# Patient Record
Sex: FEMALE | Race: WHITE | Employment: FULL TIME
[De-identification: names, ages, dates, MRNs, and addresses within clinical notes are randomized per-mention and may not be internally consistent; named-entity substitution may affect disease eponyms.]

---

## 2017-02-07 ENCOUNTER — TELEPHONE (OUTPATIENT)
Dept: FAMILY MEDICINE CLINIC | Facility: CLINIC | Age: 52
End: 2017-02-07

## 2017-02-08 ENCOUNTER — OFFICE VISIT (OUTPATIENT)
Dept: PRIMARY CARE CLINIC | Facility: CLINIC | Age: 52
End: 2017-02-08

## 2017-02-08 VITALS
BODY MASS INDEX: 28.22 KG/M2 | HEART RATE: 65 BPM | SYSTOLIC BLOOD PRESSURE: 142 MMHG | RESPIRATION RATE: 16 BRPM | DIASTOLIC BLOOD PRESSURE: 92 MMHG | HEIGHT: 59 IN | TEMPERATURE: 97.8 F | WEIGHT: 140 LBS | OXYGEN SATURATION: 96 %

## 2017-02-08 DIAGNOSIS — R51.9 ACUTE NONINTRACTABLE HEADACHE, UNSPECIFIED HEADACHE TYPE: Primary | ICD-10-CM

## 2017-02-08 LAB
INFLUENZA A ANTIBODY: NEGATIVE
INFLUENZA B ANTIBODY: NEGATIVE

## 2017-02-08 PROCEDURE — 87804 INFLUENZA ASSAY W/OPTIC: CPT | Performed by: NURSE PRACTITIONER

## 2017-02-08 PROCEDURE — 96372 THER/PROPH/DIAG INJ SC/IM: CPT | Performed by: NURSE PRACTITIONER

## 2017-02-08 PROCEDURE — 99213 OFFICE O/P EST LOW 20 MIN: CPT | Performed by: NURSE PRACTITIONER

## 2017-02-08 ASSESSMENT — ENCOUNTER SYMPTOMS
PHOTOPHOBIA: 1
RESPIRATORY NEGATIVE: 1

## 2017-03-10 RX ORDER — TOPIRAMATE 50 MG/1
TABLET, FILM COATED ORAL
Qty: 60 TABLET | Refills: 0 | Status: SHIPPED | OUTPATIENT
Start: 2017-03-10 | End: 2017-04-25 | Stop reason: SDUPTHER

## 2017-03-29 RX ORDER — ESCITALOPRAM OXALATE 20 MG/1
TABLET ORAL
Qty: 30 TABLET | Refills: 0 | Status: SHIPPED | OUTPATIENT
Start: 2017-03-29 | End: 2017-12-07 | Stop reason: SDUPTHER

## 2017-03-31 ENCOUNTER — OFFICE VISIT (OUTPATIENT)
Dept: PRIMARY CARE CLINIC | Age: 52
End: 2017-03-31
Payer: COMMERCIAL

## 2017-03-31 VITALS
BODY MASS INDEX: 28.22 KG/M2 | TEMPERATURE: 98.4 F | SYSTOLIC BLOOD PRESSURE: 127 MMHG | HEIGHT: 59 IN | HEART RATE: 64 BPM | DIASTOLIC BLOOD PRESSURE: 85 MMHG | OXYGEN SATURATION: 95 % | WEIGHT: 140 LBS | RESPIRATION RATE: 16 BRPM

## 2017-03-31 DIAGNOSIS — G43.009 MIGRAINE WITHOUT AURA AND WITHOUT STATUS MIGRAINOSUS, NOT INTRACTABLE: Primary | ICD-10-CM

## 2017-03-31 PROCEDURE — 99213 OFFICE O/P EST LOW 20 MIN: CPT | Performed by: NURSE PRACTITIONER

## 2017-03-31 PROCEDURE — 96372 THER/PROPH/DIAG INJ SC/IM: CPT | Performed by: NURSE PRACTITIONER

## 2017-03-31 RX ORDER — KETOROLAC TROMETHAMINE 30 MG/ML
60 INJECTION, SOLUTION INTRAMUSCULAR; INTRAVENOUS ONCE
Status: COMPLETED | OUTPATIENT
Start: 2017-03-31 | End: 2017-03-31

## 2017-03-31 RX ORDER — ONDANSETRON 4 MG/1
4 TABLET, ORALLY DISINTEGRATING ORAL EVERY 8 HOURS PRN
Qty: 12 TABLET | Refills: 0 | Status: SHIPPED | OUTPATIENT
Start: 2017-03-31 | End: 2017-04-04

## 2017-03-31 RX ADMIN — KETOROLAC TROMETHAMINE 60 MG: 30 INJECTION, SOLUTION INTRAMUSCULAR; INTRAVENOUS at 18:06

## 2017-03-31 ASSESSMENT — ENCOUNTER SYMPTOMS
EYE PAIN: 0
NAUSEA: 1
SINUS PRESSURE: 0
SWOLLEN GLANDS: 0
COUGH: 0
EYE WATERING: 0
RHINORRHEA: 0
VOMITING: 0
BLURRED VISION: 1
FACIAL SWEATING: 0
SCALP TENDERNESS: 0
VISUAL CHANGE: 0
ABDOMINAL PAIN: 0
SORE THROAT: 0
BACK PAIN: 0
PHOTOPHOBIA: 1
EYE REDNESS: 0

## 2017-04-07 LAB
CHOLESTEROL/HDL RATIO: 4
CHOLESTEROL: 233 MG/DL
GLUCOSE BLD-MCNC: 99 MG/DL (ref 70–99)
HDLC SERPL-MCNC: 58 MG/DL
LDL CHOLESTEROL: 148 MG/DL (ref 0–130)
PATIENT FASTING?: YES
TRIGL SERPL-MCNC: 133 MG/DL
VLDLC SERPL CALC-MCNC: 27 MG/DL (ref 1–30)

## 2017-04-17 RX ORDER — TOPIRAMATE 50 MG/1
TABLET, FILM COATED ORAL
Qty: 60 TABLET | Refills: 0 | Status: SHIPPED | OUTPATIENT
Start: 2017-04-17 | End: 2017-04-25 | Stop reason: SDUPTHER

## 2017-04-21 ENCOUNTER — APPOINTMENT (OUTPATIENT)
Dept: CT IMAGING | Age: 52
End: 2017-04-21
Payer: COMMERCIAL

## 2017-04-21 ENCOUNTER — HOSPITAL ENCOUNTER (EMERGENCY)
Age: 52
Discharge: HOME | End: 2017-04-21
Attending: EMERGENCY MEDICINE
Payer: COMMERCIAL

## 2017-04-21 VITALS
HEART RATE: 61 BPM | DIASTOLIC BLOOD PRESSURE: 95 MMHG | RESPIRATION RATE: 14 BRPM | OXYGEN SATURATION: 96 % | TEMPERATURE: 98 F | SYSTOLIC BLOOD PRESSURE: 157 MMHG

## 2017-04-21 DIAGNOSIS — G44.009 CLUSTER HEADACHE, NOT INTRACTABLE, UNSPECIFIED CHRONICITY PATTERN: Primary | ICD-10-CM

## 2017-04-21 PROCEDURE — 99284 EMERGENCY DEPT VISIT MOD MDM: CPT

## 2017-04-21 PROCEDURE — 96375 TX/PRO/DX INJ NEW DRUG ADDON: CPT

## 2017-04-21 PROCEDURE — 2580000003 HC RX 258: Performed by: EMERGENCY MEDICINE

## 2017-04-21 PROCEDURE — 70450 CT HEAD/BRAIN W/O DYE: CPT

## 2017-04-21 PROCEDURE — 96374 THER/PROPH/DIAG INJ IV PUSH: CPT

## 2017-04-21 PROCEDURE — 6360000002 HC RX W HCPCS: Performed by: EMERGENCY MEDICINE

## 2017-04-21 RX ORDER — ONDANSETRON 2 MG/ML
4 INJECTION INTRAMUSCULAR; INTRAVENOUS ONCE
Status: COMPLETED | OUTPATIENT
Start: 2017-04-21 | End: 2017-04-21

## 2017-04-21 RX ORDER — NALBUPHINE HCL 10 MG/ML
10 AMPUL (ML) INJECTION ONCE
Status: COMPLETED | OUTPATIENT
Start: 2017-04-21 | End: 2017-04-21

## 2017-04-21 RX ORDER — PROMETHAZINE HYDROCHLORIDE 25 MG/ML
12.5 INJECTION, SOLUTION INTRAMUSCULAR; INTRAVENOUS ONCE
Status: COMPLETED | OUTPATIENT
Start: 2017-04-21 | End: 2017-04-21

## 2017-04-21 RX ORDER — ONDANSETRON 4 MG/1
4 TABLET, ORALLY DISINTEGRATING ORAL EVERY 8 HOURS PRN
Qty: 6 TABLET | Refills: 0 | Status: SHIPPED | OUTPATIENT
Start: 2017-04-21 | End: 2017-04-23

## 2017-04-21 RX ORDER — HYDROCODONE BITARTRATE AND ACETAMINOPHEN 5; 325 MG/1; MG/1
1 TABLET ORAL EVERY 6 HOURS PRN
Qty: 4 TABLET | Refills: 0 | Status: SHIPPED | OUTPATIENT
Start: 2017-04-21 | End: 2017-04-22

## 2017-04-21 RX ADMIN — HYDROMORPHONE HYDROCHLORIDE 0.5 MG: 1 INJECTION, SOLUTION INTRAMUSCULAR; INTRAVENOUS; SUBCUTANEOUS at 12:54

## 2017-04-21 RX ADMIN — SODIUM CHLORIDE 500 ML: 9 INJECTION, SOLUTION INTRAVENOUS at 12:01

## 2017-04-21 RX ADMIN — PROMETHAZINE HYDROCHLORIDE 12.5 MG: 25 INJECTION INTRAMUSCULAR; INTRAVENOUS at 12:54

## 2017-04-21 RX ADMIN — ONDANSETRON 4 MG: 2 INJECTION INTRAMUSCULAR; INTRAVENOUS at 12:01

## 2017-04-21 RX ADMIN — NALBUPHINE HYDROCHLORIDE 10 MG: 10 INJECTION, SOLUTION INTRAMUSCULAR; INTRAVENOUS; SUBCUTANEOUS at 12:01

## 2017-04-21 ASSESSMENT — ENCOUNTER SYMPTOMS
TINGLING: 0
GASTROINTESTINAL NEGATIVE: 1
VISUAL CHANGE: 0
VOMITING: 0
BACK PAIN: 0
DIARRHEA: 0
COUGH: 0
PHOTOPHOBIA: 0
SORE THROAT: 0
RESPIRATORY NEGATIVE: 1
ABDOMINAL PAIN: 0
SWOLLEN GLANDS: 0
FACIAL SWELLING: 0
TROUBLE SWALLOWING: 0
EYES NEGATIVE: 1
BLURRED VISION: 0
ALLERGIC/IMMUNOLOGIC NEGATIVE: 1
VOICE CHANGE: 0
EYE PAIN: 0
SINUS PRESSURE: 0
RHINORRHEA: 0

## 2017-04-21 ASSESSMENT — PAIN SCALES - GENERAL
PAINLEVEL_OUTOF10: 2
PAINLEVEL_OUTOF10: 10
PAINLEVEL_OUTOF10: 10

## 2017-04-24 ENCOUNTER — TELEPHONE (OUTPATIENT)
Dept: FAMILY MEDICINE CLINIC | Age: 52
End: 2017-04-24

## 2017-04-25 ENCOUNTER — OFFICE VISIT (OUTPATIENT)
Dept: FAMILY MEDICINE CLINIC | Age: 52
End: 2017-04-25
Payer: COMMERCIAL

## 2017-04-25 VITALS — BODY MASS INDEX: 28.88 KG/M2 | WEIGHT: 143 LBS | SYSTOLIC BLOOD PRESSURE: 130 MMHG | DIASTOLIC BLOOD PRESSURE: 80 MMHG

## 2017-04-25 DIAGNOSIS — F41.9 ANXIETY: ICD-10-CM

## 2017-04-25 DIAGNOSIS — Z12.11 SCREENING FOR COLON CANCER: ICD-10-CM

## 2017-04-25 DIAGNOSIS — K58.2 IRRITABLE BOWEL SYNDROME WITH BOTH CONSTIPATION AND DIARRHEA: ICD-10-CM

## 2017-04-25 DIAGNOSIS — G43.109 MIGRAINE WITH AURA AND WITHOUT STATUS MIGRAINOSUS, NOT INTRACTABLE: Primary | ICD-10-CM

## 2017-04-25 PROCEDURE — 99213 OFFICE O/P EST LOW 20 MIN: CPT | Performed by: FAMILY MEDICINE

## 2017-04-25 RX ORDER — ELETRIPTAN HYDROBROMIDE 40 MG/1
40 TABLET, FILM COATED ORAL
Qty: 12 TABLET | Refills: 3 | Status: SHIPPED | OUTPATIENT
Start: 2017-04-25 | End: 2018-03-26 | Stop reason: SDUPTHER

## 2017-04-25 RX ORDER — TOPIRAMATE 50 MG/1
TABLET, FILM COATED ORAL
Qty: 180 TABLET | Refills: 1 | Status: SHIPPED | OUTPATIENT
Start: 2017-04-25 | End: 2018-03-06 | Stop reason: SDUPTHER

## 2017-04-25 RX ORDER — PANTOPRAZOLE SODIUM 40 MG/1
40 TABLET, DELAYED RELEASE ORAL
Qty: 90 TABLET | Refills: 1 | Status: SHIPPED | OUTPATIENT
Start: 2017-04-25 | End: 2018-03-26 | Stop reason: SDUPTHER

## 2017-04-25 RX ORDER — TIZANIDINE 4 MG/1
8 TABLET ORAL NIGHTLY
Qty: 180 TABLET | Refills: 1 | Status: SHIPPED | OUTPATIENT
Start: 2017-04-25 | End: 2018-03-26 | Stop reason: SDUPTHER

## 2017-04-25 ASSESSMENT — ENCOUNTER SYMPTOMS
EYE DISCHARGE: 0
EYE WATERING: 0
SCALP TENDERNESS: 0
NAUSEA: 1
EYE REDNESS: 0
EYE PAIN: 0
SORE THROAT: 0
COUGH: 0
ABDOMINAL PAIN: 0
DIARRHEA: 0
PHOTOPHOBIA: 1
SINUS PRESSURE: 0
SHORTNESS OF BREATH: 0
BLOOD IN STOOL: 0
BLURRED VISION: 1
SWOLLEN GLANDS: 0
VOMITING: 0
CONSTIPATION: 0

## 2017-04-25 ASSESSMENT — PATIENT HEALTH QUESTIONNAIRE - PHQ9
1. LITTLE INTEREST OR PLEASURE IN DOING THINGS: 0
SUM OF ALL RESPONSES TO PHQ QUESTIONS 1-9: 0
SUM OF ALL RESPONSES TO PHQ9 QUESTIONS 1 & 2: 0
2. FEELING DOWN, DEPRESSED OR HOPELESS: 0

## 2017-05-11 ENCOUNTER — HOSPITAL ENCOUNTER (EMERGENCY)
Age: 52
Discharge: HOME | End: 2017-05-11
Attending: EMERGENCY MEDICINE
Payer: COMMERCIAL

## 2017-05-11 VITALS
RESPIRATION RATE: 18 BRPM | HEART RATE: 73 BPM | SYSTOLIC BLOOD PRESSURE: 122 MMHG | DIASTOLIC BLOOD PRESSURE: 90 MMHG | OXYGEN SATURATION: 99 % | TEMPERATURE: 97.4 F | BODY MASS INDEX: 28.68 KG/M2 | WEIGHT: 142 LBS

## 2017-05-11 DIAGNOSIS — M54.50 LOW BACK PAIN POTENTIALLY ASSOCIATED WITH RADICULOPATHY: Primary | ICD-10-CM

## 2017-05-11 PROCEDURE — 6360000002 HC RX W HCPCS: Performed by: EMERGENCY MEDICINE

## 2017-05-11 PROCEDURE — 99283 EMERGENCY DEPT VISIT LOW MDM: CPT

## 2017-05-11 PROCEDURE — 20553 NJX 1/MLT TRIGGER POINTS 3/>: CPT

## 2017-05-11 PROCEDURE — 6370000000 HC RX 637 (ALT 250 FOR IP): Performed by: EMERGENCY MEDICINE

## 2017-05-11 RX ORDER — PREDNISONE 10 MG/1
TABLET ORAL
Qty: 20 TABLET | Refills: 0 | Status: SHIPPED | OUTPATIENT
Start: 2017-05-11 | End: 2017-05-21

## 2017-05-11 RX ORDER — LIDOCAINE HYDROCHLORIDE 10 MG/ML
20 INJECTION, SOLUTION INFILTRATION; PERINEURAL ONCE
Status: DISCONTINUED | OUTPATIENT
Start: 2017-05-11 | End: 2017-05-11 | Stop reason: HOSPADM

## 2017-05-11 RX ORDER — KETOROLAC TROMETHAMINE 30 MG/ML
60 INJECTION, SOLUTION INTRAMUSCULAR; INTRAVENOUS ONCE
Status: COMPLETED | OUTPATIENT
Start: 2017-05-11 | End: 2017-05-11

## 2017-05-11 RX ORDER — DIAZEPAM 5 MG/ML
2.5 INJECTION, SOLUTION INTRAMUSCULAR; INTRAVENOUS ONCE
Status: DISCONTINUED | OUTPATIENT
Start: 2017-05-11 | End: 2017-05-11

## 2017-05-11 RX ORDER — DIAZEPAM 5 MG/ML
2.5 INJECTION, SOLUTION INTRAMUSCULAR; INTRAVENOUS ONCE
Status: COMPLETED | OUTPATIENT
Start: 2017-05-11 | End: 2017-05-11

## 2017-05-11 RX ORDER — HYDROCODONE BITARTRATE AND ACETAMINOPHEN 5; 325 MG/1; MG/1
1 TABLET ORAL EVERY 6 HOURS PRN
Qty: 10 TABLET | Refills: 0 | Status: SHIPPED | OUTPATIENT
Start: 2017-05-11 | End: 2017-05-18

## 2017-05-11 RX ORDER — PREDNISONE 20 MG/1
60 TABLET ORAL ONCE
Status: COMPLETED | OUTPATIENT
Start: 2017-05-11 | End: 2017-05-11

## 2017-05-11 RX ORDER — IBUPROFEN 800 MG/1
800 TABLET ORAL EVERY 8 HOURS PRN
Qty: 30 TABLET | Refills: 0 | Status: SHIPPED | OUTPATIENT
Start: 2017-05-11

## 2017-05-11 RX ORDER — FENTANYL CITRATE 50 UG/ML
50 INJECTION, SOLUTION INTRAMUSCULAR; INTRAVENOUS ONCE
Status: COMPLETED | OUTPATIENT
Start: 2017-05-11 | End: 2017-05-11

## 2017-05-11 RX ADMIN — DIAZEPAM 2.5 MG: 5 INJECTION, SOLUTION INTRAMUSCULAR; INTRAVENOUS at 14:37

## 2017-05-11 RX ADMIN — FENTANYL CITRATE 50 MCG: 50 INJECTION INTRAMUSCULAR; INTRAVENOUS at 14:39

## 2017-05-11 RX ADMIN — PREDNISONE 60 MG: 20 TABLET ORAL at 13:26

## 2017-05-11 RX ADMIN — DIAZEPAM 2.5 MG: 5 INJECTION, SOLUTION INTRAMUSCULAR; INTRAVENOUS at 13:26

## 2017-05-11 RX ADMIN — KETOROLAC TROMETHAMINE 60 MG: 30 INJECTION, SOLUTION INTRAMUSCULAR at 13:25

## 2017-05-11 ASSESSMENT — PAIN SCALES - GENERAL
PAINLEVEL_OUTOF10: 6
PAINLEVEL_OUTOF10: 10
PAINLEVEL_OUTOF10: 8
PAINLEVEL_OUTOF10: 10
PAINLEVEL_OUTOF10: 10

## 2017-05-11 ASSESSMENT — PAIN DESCRIPTION - ORIENTATION: ORIENTATION: LOWER;RIGHT

## 2017-05-11 ASSESSMENT — PAIN DESCRIPTION - PAIN TYPE
TYPE: ACUTE PAIN
TYPE: ACUTE PAIN

## 2017-05-11 ASSESSMENT — PAIN DESCRIPTION - DESCRIPTORS: DESCRIPTORS: SHOOTING

## 2017-05-11 ASSESSMENT — PAIN DESCRIPTION - LOCATION
LOCATION: BACK
LOCATION: BACK

## 2017-05-11 ASSESSMENT — ENCOUNTER SYMPTOMS: BACK PAIN: 1

## 2017-05-12 ENCOUNTER — TELEPHONE (OUTPATIENT)
Dept: FAMILY MEDICINE CLINIC | Facility: CLINIC | Age: 52
End: 2017-05-12

## 2017-06-08 ENCOUNTER — OFFICE VISIT (OUTPATIENT)
Dept: FAMILY MEDICINE CLINIC | Age: 52
End: 2017-06-08
Payer: COMMERCIAL

## 2017-06-08 VITALS
OXYGEN SATURATION: 98 % | BODY MASS INDEX: 28.68 KG/M2 | SYSTOLIC BLOOD PRESSURE: 118 MMHG | DIASTOLIC BLOOD PRESSURE: 72 MMHG | HEART RATE: 74 BPM | WEIGHT: 142 LBS

## 2017-06-08 DIAGNOSIS — M54.16 LUMBAR BACK PAIN WITH RADICULOPATHY AFFECTING RIGHT LOWER EXTREMITY: Primary | ICD-10-CM

## 2017-06-08 PROCEDURE — 99213 OFFICE O/P EST LOW 20 MIN: CPT | Performed by: FAMILY MEDICINE

## 2017-06-08 RX ORDER — PREDNISONE 1 MG/1
TABLET ORAL
Qty: 21 TABLET | Refills: 0 | Status: SHIPPED | OUTPATIENT
Start: 2017-06-08 | End: 2017-07-17 | Stop reason: ALTCHOICE

## 2017-06-08 ASSESSMENT — ENCOUNTER SYMPTOMS: BACK PAIN: 1

## 2017-06-14 ENCOUNTER — TELEPHONE (OUTPATIENT)
Dept: FAMILY MEDICINE CLINIC | Age: 52
End: 2017-06-14

## 2017-06-14 DIAGNOSIS — M54.16 LUMBAR BACK PAIN WITH RADICULOPATHY AFFECTING RIGHT LOWER EXTREMITY: Primary | ICD-10-CM

## 2017-06-15 ASSESSMENT — ENCOUNTER SYMPTOMS
NAUSEA: 0
WHEEZING: 0
DIARRHEA: 0
COUGH: 0
COLOR CHANGE: 0
PHOTOPHOBIA: 0
CONSTIPATION: 0
ABDOMINAL DISTENTION: 0
ABDOMINAL PAIN: 0
SHORTNESS OF BREATH: 0
TROUBLE SWALLOWING: 0
VOMITING: 0
BOWEL INCONTINENCE: 0

## 2017-06-28 ENCOUNTER — HOSPITAL ENCOUNTER (OUTPATIENT)
Dept: MRI IMAGING | Age: 52
Discharge: HOME | End: 2017-06-28
Payer: COMMERCIAL

## 2017-06-28 DIAGNOSIS — M54.16 LUMBAR BACK PAIN WITH RADICULOPATHY AFFECTING RIGHT LOWER EXTREMITY: ICD-10-CM

## 2017-06-28 PROCEDURE — 72148 MRI LUMBAR SPINE W/O DYE: CPT

## 2017-07-17 ENCOUNTER — HOSPITAL ENCOUNTER (EMERGENCY)
Age: 52
Discharge: HOME | End: 2017-07-17
Attending: EMERGENCY MEDICINE
Payer: COMMERCIAL

## 2017-07-17 VITALS
OXYGEN SATURATION: 98 % | RESPIRATION RATE: 14 BRPM | TEMPERATURE: 98.4 F | HEART RATE: 56 BPM | SYSTOLIC BLOOD PRESSURE: 172 MMHG | DIASTOLIC BLOOD PRESSURE: 75 MMHG

## 2017-07-17 DIAGNOSIS — G43.811 OTHER MIGRAINE WITH STATUS MIGRAINOSUS, INTRACTABLE: Primary | ICD-10-CM

## 2017-07-17 PROCEDURE — 99283 EMERGENCY DEPT VISIT LOW MDM: CPT

## 2017-07-17 PROCEDURE — 2580000003 HC RX 258: Performed by: EMERGENCY MEDICINE

## 2017-07-17 PROCEDURE — 96372 THER/PROPH/DIAG INJ SC/IM: CPT

## 2017-07-17 PROCEDURE — 6360000002 HC RX W HCPCS: Performed by: EMERGENCY MEDICINE

## 2017-07-17 PROCEDURE — 96374 THER/PROPH/DIAG INJ IV PUSH: CPT

## 2017-07-17 RX ORDER — KETOROLAC TROMETHAMINE 30 MG/ML
30 INJECTION, SOLUTION INTRAMUSCULAR; INTRAVENOUS ONCE
Status: COMPLETED | OUTPATIENT
Start: 2017-07-17 | End: 2017-07-17

## 2017-07-17 RX ORDER — DIPHENHYDRAMINE HYDROCHLORIDE 50 MG/ML
25 INJECTION INTRAMUSCULAR; INTRAVENOUS ONCE
Status: COMPLETED | OUTPATIENT
Start: 2017-07-17 | End: 2017-07-17

## 2017-07-17 RX ORDER — ONDANSETRON 4 MG/1
4 TABLET, ORALLY DISINTEGRATING ORAL ONCE
Status: COMPLETED | OUTPATIENT
Start: 2017-07-17 | End: 2017-07-17

## 2017-07-17 RX ORDER — 0.9 % SODIUM CHLORIDE 0.9 %
1000 INTRAVENOUS SOLUTION INTRAVENOUS ONCE
Status: COMPLETED | OUTPATIENT
Start: 2017-07-17 | End: 2017-07-17

## 2017-07-17 RX ORDER — ELETRIPTAN HYDROBROMIDE 40 MG/1
40 TABLET, FILM COATED ORAL
COMMUNITY
End: 2021-12-09 | Stop reason: ALTCHOICE

## 2017-07-17 RX ADMIN — KETOROLAC TROMETHAMINE 30 MG: 30 INJECTION, SOLUTION INTRAMUSCULAR at 16:50

## 2017-07-17 RX ADMIN — HYDROMORPHONE HYDROCHLORIDE 1 MG: 1 INJECTION, SOLUTION INTRAMUSCULAR; INTRAVENOUS; SUBCUTANEOUS at 16:07

## 2017-07-17 RX ADMIN — ONDANSETRON 4 MG: 4 TABLET, ORALLY DISINTEGRATING ORAL at 16:30

## 2017-07-17 RX ADMIN — DIPHENHYDRAMINE HYDROCHLORIDE 25 MG: 50 INJECTION, SOLUTION INTRAMUSCULAR; INTRAVENOUS at 16:07

## 2017-07-17 RX ADMIN — SODIUM CHLORIDE 1000 ML: 9 INJECTION, SOLUTION INTRAVENOUS at 16:51

## 2017-07-17 ASSESSMENT — PAIN DESCRIPTION - LOCATION: LOCATION: HEAD

## 2017-07-17 ASSESSMENT — PAIN SCALES - GENERAL
PAINLEVEL_OUTOF10: 10
PAINLEVEL_OUTOF10: 0
PAINLEVEL_OUTOF10: 8

## 2017-07-17 ASSESSMENT — PAIN DESCRIPTION - PROGRESSION: CLINICAL_PROGRESSION: GRADUALLY WORSENING

## 2017-07-17 ASSESSMENT — PAIN DESCRIPTION - PAIN TYPE: TYPE: ACUTE PAIN

## 2017-07-17 ASSESSMENT — PAIN DESCRIPTION - FREQUENCY: FREQUENCY: CONTINUOUS

## 2017-07-17 ASSESSMENT — PAIN DESCRIPTION - ORIENTATION: ORIENTATION: LEFT;RIGHT

## 2017-07-17 ASSESSMENT — PAIN DESCRIPTION - DESCRIPTORS: DESCRIPTORS: CONSTANT

## 2017-07-19 ENCOUNTER — CARE COORDINATION (OUTPATIENT)
Dept: FAMILY MEDICINE CLINIC | Age: 52
End: 2017-07-19

## 2017-08-01 ENCOUNTER — INITIAL CONSULT (OUTPATIENT)
Dept: NEUROSURGERY | Age: 52
End: 2017-08-01
Payer: COMMERCIAL

## 2017-08-01 VITALS
HEART RATE: 65 BPM | WEIGHT: 140 LBS | BODY MASS INDEX: 28.28 KG/M2 | DIASTOLIC BLOOD PRESSURE: 88 MMHG | SYSTOLIC BLOOD PRESSURE: 135 MMHG

## 2017-08-01 DIAGNOSIS — M54.50 CHRONIC MIDLINE LOW BACK PAIN WITHOUT SCIATICA: Primary | ICD-10-CM

## 2017-08-01 DIAGNOSIS — G89.29 CHRONIC MIDLINE LOW BACK PAIN WITHOUT SCIATICA: Primary | ICD-10-CM

## 2017-08-01 PROCEDURE — 99243 OFF/OP CNSLTJ NEW/EST LOW 30: CPT | Performed by: NEUROLOGICAL SURGERY

## 2017-08-01 ASSESSMENT — VISUAL ACUITY: VA_NORMAL: 1

## 2017-12-05 ENCOUNTER — OFFICE VISIT (OUTPATIENT)
Dept: PRIMARY CARE CLINIC | Age: 52
End: 2017-12-05
Payer: COMMERCIAL

## 2017-12-05 VITALS
TEMPERATURE: 98.1 F | HEART RATE: 59 BPM | BODY MASS INDEX: 28.22 KG/M2 | OXYGEN SATURATION: 97 % | WEIGHT: 140 LBS | RESPIRATION RATE: 22 BRPM | DIASTOLIC BLOOD PRESSURE: 80 MMHG | SYSTOLIC BLOOD PRESSURE: 125 MMHG | HEIGHT: 59 IN

## 2017-12-05 DIAGNOSIS — G43.909 MIGRAINE WITHOUT STATUS MIGRAINOSUS, NOT INTRACTABLE, UNSPECIFIED MIGRAINE TYPE: Primary | ICD-10-CM

## 2017-12-05 PROCEDURE — 99213 OFFICE O/P EST LOW 20 MIN: CPT | Performed by: NURSE PRACTITIONER

## 2017-12-05 PROCEDURE — 96372 THER/PROPH/DIAG INJ SC/IM: CPT | Performed by: NURSE PRACTITIONER

## 2017-12-05 ASSESSMENT — ENCOUNTER SYMPTOMS
PHOTOPHOBIA: 1
RESPIRATORY NEGATIVE: 1

## 2017-12-05 NOTE — PROGRESS NOTES
Nerves:   CN II: Visual fields intact to confrontation. CN III, IV, VI: External ocular movements full, no afferent defect, no LIDA, no ptosis. CN V: Normal facial sensation bilaterally. CN VII: Normal facial symmetry. CN VIII: Intact hearing. CN IX, X: Symmetrical palate. CN XI: Symmetrical shoulder shrug. CN XII: Midline tongue, no atrophy. Speaks clear without slur. Motor: Normal bulk, normal tone, normal power, no involuntary movements, no tremor. Sensory: Normal touch, normal pen, normal vibration. Cerebellar: Intact fine motor control and upper extremities. Station and Gait: Normal Station, normal gait        Skin: No rash noted. Nursing note and vitals reviewed. /80   Pulse 59   Temp 98.1 °F (36.7 °C) (Oral)   Resp 22   Ht 4' 11\" (1.499 m)   Wt 140 lb (63.5 kg)   SpO2 97%   BMI 28.28 kg/m²     Assessment:     1. Migraine without status migrainosus, not intractable, unspecified migraine type  ketorolac (TORADOL) injection 30 mg       Plan:   Elina Glynn was seen today for migraine. Diagnoses and all orders for this visit:    Migraine without status migrainosus, not intractable, unspecified migraine type  -     ketorolac (TORADOL) injection 30 mg; Inject 1 mL into the muscle once    Discussed exam, POCT findings, plan of care (including prescriptive and supportive as listed below) and follow-up at length with patient. Recommend  IM Toradol today; educated patient on all medications including administration, expected results and side effects. Understanding voiced. To ER or call 911 if any difficulty breathing, shortness of breath, inability to swallow, hives or temp greater than 103 degrees. Return if symptoms worsen or fail to improve, for ED for worsening symptoms. Orders Placed This Encounter   Medications    ketorolac (TORADOL) injection 30 mg          All patient questions answered. Pt voiced understanding.       Electronically signed by Patrick North

## 2017-12-05 NOTE — PATIENT INSTRUCTIONS
about ketorolac? You should not use ketorolac if you have any active or recent bleeding (including bleeding inside your body), a head injury, a stomach ulcer, severe kidney disease, a bleeding or blood-clotting disorder, a history of severe allergic reaction to aspirin or an NSAID, if you are scheduled to have surgery, if you are in late pregnancy, or if you are breast-feeding a baby. You should not use ketorolac if you also take pentoxifylline, probenecid, aspirin, or other NSAIDs. Ketorolac can increase your risk of fatal heart attack or stroke, especially if you use it long term or take high doses, or if you have heart disease. Do not use this medicine just before or after heart bypass surgery (coronary artery bypass graft, or CABG). Ketorolac may also cause stomach or intestinal bleeding, which can be fatal. These conditions can occur without warning while you are using ketorolac, especially in older adults. You should not take this medicine if you already have bleeding in your stomach or intestines. What is ketorolac? Ketorolac is a nonsteroidal anti-inflammatory drug (NSAID). Ketorolac works by reducing hormones that cause inflammation and pain in the body. Ketorolac is used short-term (5 days or less) to treat moderate to severe pain. Ketorolac may also be used for purposes not listed in this medication guide. What should I discuss with my healthcare provider before taking ketorolac? Ketorolac can increase your risk of fatal heart attack or stroke, especially if you use it long term or take high doses, or if you have heart disease. Even people without heart disease or risk factors could have a stroke or heart attack while taking this medicine. Do not use this medicine just before or after heart bypass surgery (coronary artery bypass graft, or CABG).   Ketorolac may also cause stomach or intestinal bleeding, which can be fatal. These conditions can occur without warning while you are using ketorolac, especially in older adults. You should not use ketorolac if you are allergic to it, or if you have:  · active or recent stomach ulcer, stomach bleeding, or intestinal bleeding;  · a bleeding or blood-clotting disorder;  · a closed head injury or bleeding in your brain;  · bleeding from a recent surgery;  · severe kidney disease or dehydration;  · a history of asthma or severe allergic reaction after taking aspirin or an NSAID;  · if you are scheduled to have surgery (especially bypass surgery); or  · if you are in late pregnancy or you are breast-feeding a baby. Some medicines can cause unwanted or dangerous effects when used with ketorolac. Your doctor may need to change your treatment plan if you use any of the following drugs:  · pentoxifylline;  · probenecid; or  · aspirin or other NSAIDs --ibuprofen (Advil, Motrin), naproxen (Aleve), celecoxib, diclofenac, indomethacin, meloxicam, and others. To make sure ketorolac is safe for you, tell your doctor if you have:  · heart disease, high blood pressure, high cholesterol, diabetes, or if you smoke;  · a history of heart attack, stroke, or blood clot;  · a history of stomach ulcers or bleeding;  · inflammatory bowel disease, ulcerative colitis, or Crohn's disease;  · liver disease;  · kidney disease (or if you are on dialysis);  · asthma; or  · fluid retention. Using ketorolac during the last 3 months of pregnancy may harm the unborn baby. Ketorolac may also increase the risk of uterine bleeding and is not for use during labor and delivery. Tell your doctor if you are pregnant. Ketorolac can pass into breast milk and may harm a nursing baby. Do not breast-feed while using this medicine. Ketorolac is not approved for use by anyone younger than 3years old. How should I take ketorolac? Ketorolac is usually given first as an injection, and then as an oral (by mouth) medicine. The injection is given into a muscle, or into a vein through an IV.  A [facility] provider will give you the injection. Follow all directions on your prescription label. Do not take this medicine in larger amounts or for longer than recommended. Use the lowest dose that is effective in treating your condition. Ketorolac should not be used for longer than 5 days, including both injection plus tablets. Long-term use of this medicine can damage your kidneys or cause bleeding. Store at room temperature away from moisture, heat, and light. Keep the bottle tightly closed when not in use. Read all patient information, medication guides, and instruction sheets provided to you. Ask your doctor or pharmacist if you have any questions. What happens if I miss a dose? Since ketorolac is used for pain, you are not likely to miss a dose. Skip any missed dose if it is almost time for your next scheduled dose. Do not use extra medicine to make up the missed dose. What happens if I overdose? Seek emergency medical attention or call the Poison Help line at 1-596.930.8271. What should I avoid while taking ketorolac? Avoid drinking alcohol. It may increase your risk of stomach bleeding. Ask a doctor or pharmacist before using any cold, allergy, or pain medication. Many medicines available over the counter contain aspirin or other medicines similar to ketorolac. Taking certain products together can cause you to get too much of this type of medication. Check the label to see if a medicine contains aspirin, ibuprofen, ketoprofen, or naproxen. What are the possible side effects of ketorolac? Get emergency medical help if you have signs of an allergic reaction: sneezing, runny or stuffy nose; wheezing or trouble breathing; hives; swelling of your face, lips, tongue, or throat. Get emergency medical help if you have signs of a heart attack or stroke: chest pain spreading to your jaw or shoulder, sudden numbness or weakness on one side of the body, slurred speech, feeling short of breath.   Stop using ketorolac and call your doctor at once if you have:  · shortness of breath (even with mild exertion); · swelling or rapid weight gain;  · the first sign of any skin rash, no matter how mild;  · signs of stomach bleeding --bloody or tarry stools, coughing up blood or vomit that looks like coffee grounds;  · liver problems --nausea, upper stomach pain, itching, tired feeling, flu-like symptoms, loss of appetite, dark urine, ernst-colored stools, jaundice (yellowing of the skin or eyes);  · kidney problems --little or no urinating, painful or difficult urination, swelling in your feet or ankles, feeling tired or short of breath;  · low red blood cells (anemia) --pale skin, feeling light-headed or short of breath, rapid heart rate, trouble concentrating; or  · severe skin reaction --fever, sore throat, swelling in your face or tongue, burning in your eyes, skin pain followed by a red or purple skin rash that spreads (especially in the face or upper body) and causes blistering and peeling. Common side effects may include:  · nausea, stomach pain, indigestion, diarrhea;  · dizziness, drowsiness;  · headache; or  · swelling. This is not a complete list of side effects and others may occur. Call your doctor for medical advice about side effects. You may report side effects to FDA at 6-357-FDA-3383. What other drugs will affect ketorolac? Ask your doctor before using ketorolac if you take an antidepressant such as citalopram, escitalopram, fluoxetine (Prozac), fluvoxamine, paroxetine, sertraline (Zoloft), trazodone, or vilazodone. Taking any of these medicines with an NSAID may cause you to bruise or bleed easily.   Tell your doctor about all your current medicines and any you start or stop using, especially:  · lithium;  · methotrexate;  · heparin or warfarin (Coumadin, Liyah Mendes);  · antipsychotic medicine;  · heart or blood pressure medication, including a diuretic or \"water pill\";  · seizure medicine (carbamazepine, phenytoin);

## 2017-12-07 RX ORDER — ESCITALOPRAM OXALATE 20 MG/1
TABLET ORAL
Qty: 90 TABLET | Refills: 1 | Status: SHIPPED | OUTPATIENT
Start: 2017-12-07 | End: 2018-08-17 | Stop reason: SDUPTHER

## 2017-12-11 ENCOUNTER — HOSPITAL ENCOUNTER (EMERGENCY)
Age: 52
Discharge: HOME | End: 2017-12-11
Attending: EMERGENCY MEDICINE
Payer: COMMERCIAL

## 2017-12-11 VITALS
DIASTOLIC BLOOD PRESSURE: 69 MMHG | WEIGHT: 145 LBS | HEIGHT: 59 IN | HEART RATE: 62 BPM | TEMPERATURE: 98 F | SYSTOLIC BLOOD PRESSURE: 125 MMHG | RESPIRATION RATE: 16 BRPM | BODY MASS INDEX: 29.23 KG/M2 | OXYGEN SATURATION: 97 %

## 2017-12-11 DIAGNOSIS — G43.911 INTRACTABLE MIGRAINE WITH STATUS MIGRAINOSUS, UNSPECIFIED MIGRAINE TYPE: Primary | ICD-10-CM

## 2017-12-11 PROCEDURE — 99283 EMERGENCY DEPT VISIT LOW MDM: CPT

## 2017-12-11 PROCEDURE — 96361 HYDRATE IV INFUSION ADD-ON: CPT

## 2017-12-11 PROCEDURE — 6360000002 HC RX W HCPCS: Performed by: EMERGENCY MEDICINE

## 2017-12-11 PROCEDURE — 2580000003 HC RX 258: Performed by: EMERGENCY MEDICINE

## 2017-12-11 PROCEDURE — 96374 THER/PROPH/DIAG INJ IV PUSH: CPT

## 2017-12-11 RX ORDER — 0.9 % SODIUM CHLORIDE 0.9 %
1000 INTRAVENOUS SOLUTION INTRAVENOUS ONCE
Status: COMPLETED | OUTPATIENT
Start: 2017-12-11 | End: 2017-12-11

## 2017-12-11 RX ADMIN — SODIUM CHLORIDE 1000 ML: 9 INJECTION, SOLUTION INTRAVENOUS at 11:29

## 2017-12-11 RX ADMIN — PROCHLORPERAZINE EDISYLATE 10 MG: 5 INJECTION INTRAMUSCULAR; INTRAVENOUS at 11:29

## 2017-12-11 ASSESSMENT — PAIN SCALES - GENERAL
PAINLEVEL_OUTOF10: 0
PAINLEVEL_OUTOF10: 0
PAINLEVEL_OUTOF10: 10

## 2017-12-11 ASSESSMENT — PAIN DESCRIPTION - LOCATION: LOCATION: HEAD;BACK

## 2017-12-11 ASSESSMENT — PAIN DESCRIPTION - PAIN TYPE: TYPE: ACUTE PAIN

## 2017-12-11 NOTE — ED PROVIDER NOTES
eMERGENCY dEPARTMENT eNCOUnter        [facility]    Chief Complaint   Patient presents with    Back Pain     Pt states, \"last night my back went out\"    Migraine     Pt has nausea and migraine at this time. Pt takes relpax and received toradol shot last week. HPI    Yani Sena is a 47 y.o. female who presents to ED from work. By walk. With complaint of Headache. Patient has history of migraines. Onset today. Patient denies abrupt onset of the headache denies worse at onset headache. Intensity of symptoms moderate. Location of symptoms frontal headache. Patient also has some low back pain. \"My back went out last night\".       REVIEW OF SYSTEMS    All systems reviewed and positives are in the HPI      PAST MEDICAL HISTORY    Past Medical History:   Diagnosis Date    Anemia as a teen    Cerebral artery occlusion with cerebral infarction ([facility] [address]     \"mini stroke\"    Crohn's     Headache, classical migraine     migraines    Hyperlipidemia     Nausea & vomiting     Problems with head, neck, and trunk        SURGICAL HISTORY    Past Surgical History:   Procedure Laterality Date    BACK SURGERY      cervical spine injections    BLADDER SURGERY  1972    CARPAL TUNNEL RELEASE Right     right dequervains also    CERVICAL SPINE SURGERY  2004    HYSTERECTOMY, TOTAL ABDOMINAL  2001    OVARY REMOVAL  2008    SHOULDER SURGERY  2003       CURRENT MEDICATIONS    Current Outpatient Rx   Medication Sig Dispense Refill    escitalopram (LEXAPRO) 20 MG tablet TAKE 1 TABLET BY MOUTH DAILY 90 tablet 1    eletriptan (RELPAX) 40 MG tablet Take 40 mg by mouth once as needed may repeat in 2 hours if necessary      ibuprofen (ADVIL;MOTRIN) 800 MG tablet Take 1 tablet by mouth every 8 hours as needed for Pain 30 tablet 0    pantoprazole (PROTONIX) 40 MG tablet Take 1 tablet by mouth every morning (before breakfast) 90 tablet 1    tiZANidine (ZANAFLEX) 4 MG tablet Take 2 tablets by mouth nightly 180 tablet 1    topiramate (TOPAMAX) 50 MG tablet Take 1 tablet by mouth 2 times daily 180 tablet 1    aspirin EC 81 MG EC tablet Take 1 tablet by mouth daily 90 tablet 3    eletriptan (RELPAX) 40 MG tablet Take 1 tablet by mouth once as needed (migraine) may repeat in 2 hours if necessary (no more than 2 pills in 24 hours) 12 tablet 3       ALLERGIES    Allergies   Allergen Reactions    Sulfa Antibiotics      Kidney spasm       FAMILY HISTORY    History reviewed. No pertinent family history. SOCIAL HISTORY    Social History     Social History    Marital status: Single     Spouse name: N/A    Number of children: N/A    Years of education: N/A     Social History Main Topics    Smoking status: Never Smoker    Smokeless tobacco: Never Used    Alcohol use No    Drug use: No    Sexual activity: Not Asked     Other Topics Concern    None     Social History Narrative    None       PHYSICAL EXAM    VITAL SIGNS: /69   Pulse 62   Temp 98 °F (36.7 °C) (Oral)   Resp 16   Ht 4' 11\" (1.499 m)   Wt 145 lb (65.8 kg)   SpO2 97%   BMI 29.29 kg/m²   Constitutional:  Well developed, well nourished, no acute distress, non-toxic appearance   Eyes: PERRL is equal and reactive extraocular movements intact HENT: Moist membranes Respiratory:  No respiratory distress, normal breath sounds   Cardiovascular:  Normal rate, normal rhythm, no murmurs, no gallops, no rubs   Musculoskeletal:  No edema, no tenderness, no deformities. Back- no tenderness   Integument:  Well hydrated, no rash   Neurologic:  She was alert and oriented ×3 no focal deficits     EKG      RADIOLOGY  No orders to display       Labs  Labs Reviewed - No data to display    PROCEDURES          Summation      Patient Course: Patient is given IV fluids in ED patient was given also Compazine 10 mg IV. Patient feels better. Patient is sent home to rest is recommended. The warning signs were discussed. Return to ED if worse.     ED Medications administered this visit:    Medications   0.9 % sodium chloride bolus (0 mLs Intravenous Stopped 12/11/17 1243)   prochlorperazine (COMPAZINE) injection 10 mg (10 mg Intravenous Given 12/11/17 1129)       New Prescriptions from this visit:    Discharge Medication List as of 12/11/2017 12:18 PM          Follow-up:  [facility] ED  [address] [address]  [phone]    If symptoms worsen, As needed        Final Impression:   1.  Intractable migraine with status migrainosus, unspecified migraine type               (Please note that portions of this note were completed with a voice recognition program.  Efforts were made to edit the dictations but occasionally words are mis-transcribed.)      Yenni Patino MD  12/12/17 1995

## 2017-12-11 NOTE — LETTER
[facility] ED  [address] [address]  Phone: [phone]               December 11, 2017    Patient: Fany Barba   YOB: 1965   Date of Visit: 12/11/2017       To Whom It May Concern:    Georgina Verdugo was seen and treated in our emergency department on 12/11/2017. Please excuse her from work on 12/11/2017.       Sincerely,       Ladonna Drake RN         Signature:__________________________________

## 2017-12-13 ENCOUNTER — CARE COORDINATION (OUTPATIENT)
Dept: CARE COORDINATION | Age: 52
End: 2017-12-13

## 2018-01-26 ENCOUNTER — CARE COORDINATION (OUTPATIENT)
Dept: CARE COORDINATION | Age: 53
End: 2018-01-26

## 2018-03-01 ENCOUNTER — CARE COORDINATION (OUTPATIENT)
Dept: CARE COORDINATION | Age: 53
End: 2018-03-01

## 2018-03-01 NOTE — CARE COORDINATION
Ambulatory Care Coordination Note  3/1/2018  CM Risk Score: 8  Cecily Mortality Risk Score:      ACC: Rakan Wilcox RN    Summary Note:     Reach out today to patient. Patient Active Problem List    Diagnosis Date Noted    Precordial pain 11/29/2016    Migraine without status migrainosus, not intractable 02/16/2016    Mixed hyperlipidemia 02/16/2016    Arthropathy of cervical facet joint 02/27/2014    Radiculopathy, cervical region 06/10/2013    Paresthesia and pain of both upper extremities 06/10/2013    Panic attacks 02/21/2013    Anxiety 02/21/2013    Crohn's disease ([facility] [address] 04/06/2011    Anemia      Patient's PCP is Dr. Kashmir Faustin. She will be due here in April for 1 y ear follow up. Informed patient of this, and patient states, Marisela Molina will [place] this nurse CC tomorrow to set up time. \"     Did discuss with patient her Migraines, and following up with neurosurgery as directed. Patient states, \"her migraines have been really good here lately. \" Patient will call this nurse CC tomorrow; to schedule with Dr. Kashmir Faustin in the next couple weeks, and can discuss with her at that time getting follow up neurosurgeon if needed. Patient independent and works full time for [employer]. Patient denies any issues with medication cost at this time. No other CC needs assessed. CC Plan: Will await call back from patient to make sure patient gets scheduled with Dr. Kashmir Faustin, and to see if needs assistance with scheduling with Neurology/neurosurgeon. Patient has history of Migraines. Patient did see neurosurgery for Back pain back in August 2017. Patient in the ED 12/2017 for migraine, 07/2017 for migraine, 05/2017 for low back pain, and 04/2017 for cluster headache. Neurosurgeon note 08/2017:     Jaison Wright is a 47 y.o.  female on whom neurosurgical consultation was requested by Ruthy Briceno MD for management of back pain. She reports that her pain level is 7 out of 10.   Sitting and standing aggravates 4/25/17   Patricia Taveras MD   tiZANidine (ZANAFLEX) 4 MG tablet Take 2 tablets by mouth nightly 4/25/17   Patricia Taveras MD   topiramate (TOPAMAX) 50 MG tablet Take 1 tablet by mouth 2 times daily 4/25/17   Patricia Taveras MD   eletriptan (RELPAX) 40 MG tablet Take 1 tablet by mouth once as needed (migraine) may repeat in 2 hours if necessary (no more than 2 pills in 24 hours) 4/25/17 4/25/17  Patricia Taveras MD   aspirin EC 81 MG EC tablet Take 1 tablet by mouth daily 8/1/16   Lady Landry, DO       No future appointments.

## 2018-03-06 ENCOUNTER — OFFICE VISIT (OUTPATIENT)
Dept: FAMILY MEDICINE CLINIC | Age: 53
End: 2018-03-06
Payer: COMMERCIAL

## 2018-03-06 ENCOUNTER — CARE COORDINATION (OUTPATIENT)
Dept: CARE COORDINATION | Age: 53
End: 2018-03-06

## 2018-03-06 VITALS
BODY MASS INDEX: 30.09 KG/M2 | SYSTOLIC BLOOD PRESSURE: 128 MMHG | OXYGEN SATURATION: 98 % | TEMPERATURE: 98.7 F | DIASTOLIC BLOOD PRESSURE: 84 MMHG | WEIGHT: 149 LBS | HEART RATE: 100 BPM

## 2018-03-06 DIAGNOSIS — J06.9 VIRAL URI: Primary | ICD-10-CM

## 2018-03-06 DIAGNOSIS — J02.9 ACUTE VIRAL PHARYNGITIS: ICD-10-CM

## 2018-03-06 PROCEDURE — 99213 OFFICE O/P EST LOW 20 MIN: CPT | Performed by: NURSE PRACTITIONER

## 2018-03-06 RX ORDER — PROMETHAZINE HYDROCHLORIDE 6.25 MG/5ML
6.25 SYRUP ORAL 4 TIMES DAILY PRN
Qty: 118 ML | Refills: 0 | Status: SHIPPED | OUTPATIENT
Start: 2018-03-06 | End: 2018-03-13

## 2018-03-06 RX ORDER — TOPIRAMATE 50 MG/1
TABLET, FILM COATED ORAL
Qty: 180 TABLET | Refills: 1 | Status: SHIPPED | OUTPATIENT
Start: 2018-03-06 | End: 2019-04-18 | Stop reason: SDUPTHER

## 2018-03-06 ASSESSMENT — ENCOUNTER SYMPTOMS
SHORTNESS OF BREATH: 0
SORE THROAT: 1
VOMITING: 0
DIARRHEA: 0
COUGH: 1
NAUSEA: 0

## 2018-03-06 NOTE — PROGRESS NOTES
vomiting. Physical Exam:     Vitals:  /84   Pulse 100   Temp 98.7 °F (37.1 °C) (Oral)   Wt 149 lb (67.6 kg)   SpO2 98%   BMI 30.09 kg/m²       Physical Exam   Constitutional: She is oriented to person, place, and time. She appears well-developed and well-nourished. HENT:   Nose: Rhinorrhea present. Mouth/Throat: Posterior oropharyngeal erythema present. No oropharyngeal exudate. Cardiovascular: Normal rate and regular rhythm. Pulmonary/Chest: Effort normal and breath sounds normal. No respiratory distress. Neurological: She is alert and oriented to person, place, and time. Skin: Skin is warm and dry. Nursing note and vitals reviewed. Data:     Lab Results   Component Value Date     11/29/2016    K 4.5 11/29/2016     11/29/2016    CO2 21 11/29/2016    BUN 20 11/29/2016    CREATININE 0.88 11/29/2016    GLUCOSE 99 04/07/2017    PROT 6.8 12/16/2013    LABALBU 4.0 12/16/2013    BILITOT 0.20 12/16/2013    ALKPHOS 68 12/16/2013    AST 23 12/16/2013    ALT 22 12/16/2013     Lab Results   Component Value Date    WBC 5.5 11/29/2016    RBC 4.34 11/29/2016    HGB 13.1 11/29/2016    HCT 39.4 11/29/2016    MCV 90.6 11/29/2016    MCH 30.0 11/29/2016    MCHC 33.2 11/29/2016    RDW 13.8 11/29/2016     11/29/2016    MPV NOT REPORTED 11/29/2016     Lab Results   Component Value Date    TSH 4.16 07/22/2015     Lab Results   Component Value Date    CHOL 233 04/07/2017    HDL 58 04/07/2017          Assessment & Plan       1. Viral URI     2. Acute viral pharyngitis       Patient has been having symptoms of a viral URI. No need for antibiotics. Increase rest and water intake  May use warm tea and honey for sore throat  May gargle salt water for sore throat  May use saline nose spray for nasal congestion      Patient verbalizes understanding and agreement with plan. All questions answered. If symptoms do not resolve or worsen, return to office.                  Completed Refills   Requested Prescriptions     Signed Prescriptions Disp Refills    promethazine (PHENERGAN) 6.25 MG/5ML syrup 118 mL 0     Sig: Take 5 mLs by mouth 4 times daily as needed for Nausea     Return if symptoms worsen or fail to improve. Orders Placed This Encounter   Medications    promethazine (PHENERGAN) 6.25 MG/5ML syrup     Sig: Take 5 mLs by mouth 4 times daily as needed for Nausea     Dispense:  118 mL     Refill:  0     No orders of the defined types were placed in this encounter. Patient Instructions     SURVEY:    You may be receiving a survey from [employer] regarding your visit today. Please complete the survey to enable us to provide the highest quality of care to you and your family. If you cannot score us a very good on any question, please call the office to discuss how we could of made your experience a very good one. Thank you. Electronically signed by Mercedes Connors CNP on 3/6/2018 at 1:06 PM           Completed Refills   Requested Prescriptions     Signed Prescriptions Disp Refills    promethazine (PHENERGAN) 6.25 MG/5ML syrup 118 mL 0     Sig: Take 5 mLs by mouth 4 times daily as needed for Nausea         Sadie received counseling on the following healthy behaviors: nutrition and medication adherence  Reviewed prior labs and health maintenance. Continue current medications, diet and exercise. Discussed use, benefit, and side effects of prescribed medications. Barriers to medication compliance addressed. Patient given educational materials - see patient instructions. All patient questions answered. Patient voiced understanding.

## 2018-03-16 ENCOUNTER — CARE COORDINATION (OUTPATIENT)
Dept: CARE COORDINATION | Age: 53
End: 2018-03-16

## 2018-03-16 NOTE — CARE COORDINATION
to maintain home (clean home, laundry): Independent  Ability to drive and/or has transportation:  Independent  Ability to do shopping:  Independent  Ability to manage finances: Independent  Is patient able to live independently?:  Yes     Current Housing:  Private Residence        Per the Fall Risk Screening, did the patient have 2 or more falls or 1 fall with injury in the past year?:  No     Frequent urination at night?:  No  Do you use rails/bars?:  No  Do you have a non-slip tub mat?:  No     Are you experiencing loss of meaning?:  No  Are you experiencing loss of hope and peace?:  No     Thinking about your patient's physical health needs, are there any symptoms or problems (risk indicators) you are unsure about that require further investigation?:  No identified areas of uncertainly or problems already being investigated   Are the patients physical health problems impacting on their mental well-being?:  No identified areas of concern   Are there any problems with your patients lifestyle behaviors (alcohol, drugs, diet, exercise) that are impacting on physical or mental well-being?:  No identified areas of concern   Do you have any other concerns about your patients mental well-being?  How would you rate their severity and impact on the patient?:  No identified areas of concern   How would you rate their home environment in terms of safety and stability (including domestic violence, insecure housing, neighbor harassment)?:  Consistently safe, supportive, stable, no identified problems   How do daily activities impact on the patient's well-being? (include current or anticipated unemployment, work, caregiving, access to transportation or other):  No identified problems or perceived positive benefits   How would you rate their social network (family, work, friends)?:  Good participation with social networks   How would you rate their financial resources (including ability to afford all required medical care)?: mg by mouth once as needed may repeat in 2 hours if necessary    Historical Provider, MD   ibuprofen (ADVIL;MOTRIN) 800 MG tablet Take 1 tablet by mouth every 8 hours as needed for Pain 5/11/17   Luis Angel Colon DO   pantoprazole (PROTONIX) 40 MG tablet Take 1 tablet by mouth every morning (before breakfast) 4/25/17   Erin Singh MD   tiZANidine (ZANAFLEX) 4 MG tablet Take 2 tablets by mouth nightly 4/25/17   Erin Singh MD   eletriptan (RELPAX) 40 MG tablet Take 1 tablet by mouth once as needed (migraine) may repeat in 2 hours if necessary (no more than 2 pills in 24 hours) 4/25/17 4/25/17  Erin Singh MD   aspirin EC 81 MG EC tablet Take 1 tablet by mouth daily 8/1/16   Daniel Hammond DO       Future Appointments  Date Time Provider Ross Mitchell   3/26/2018 7:45 AM MD Ruba Childress [facility]

## 2018-03-17 ENCOUNTER — OFFICE VISIT (OUTPATIENT)
Dept: PRIMARY CARE CLINIC | Age: 53
End: 2018-03-17
Payer: COMMERCIAL

## 2018-03-17 VITALS
TEMPERATURE: 98.3 F | OXYGEN SATURATION: 98 % | WEIGHT: 149 LBS | SYSTOLIC BLOOD PRESSURE: 138 MMHG | BODY MASS INDEX: 30.04 KG/M2 | DIASTOLIC BLOOD PRESSURE: 98 MMHG | HEIGHT: 59 IN

## 2018-03-17 DIAGNOSIS — J20.9 BRONCHITIS, ACUTE, WITH BRONCHOSPASM: ICD-10-CM

## 2018-03-17 DIAGNOSIS — J01.00 ACUTE NON-RECURRENT MAXILLARY SINUSITIS: Primary | ICD-10-CM

## 2018-03-17 PROCEDURE — 99213 OFFICE O/P EST LOW 20 MIN: CPT | Performed by: NURSE PRACTITIONER

## 2018-03-17 PROCEDURE — 87804 INFLUENZA ASSAY W/OPTIC: CPT | Performed by: NURSE PRACTITIONER

## 2018-03-17 RX ORDER — BENZONATATE 100 MG/1
100 CAPSULE ORAL 3 TIMES DAILY PRN
Qty: 21 CAPSULE | Refills: 0 | Status: SHIPPED | OUTPATIENT
Start: 2018-03-17 | End: 2018-03-24

## 2018-03-17 RX ORDER — AMOXICILLIN AND CLAVULANATE POTASSIUM 875; 125 MG/1; MG/1
1 TABLET, FILM COATED ORAL 2 TIMES DAILY
Qty: 20 TABLET | Refills: 0 | Status: SHIPPED | OUTPATIENT
Start: 2018-03-17 | End: 2018-03-26 | Stop reason: ALTCHOICE

## 2018-03-17 RX ORDER — GUAIFENESIN AND CODEINE PHOSPHATE 100; 10 MG/5ML; MG/5ML
5 SOLUTION ORAL EVERY 4 HOURS PRN
Qty: 120 ML | Refills: 0 | Status: SHIPPED | OUTPATIENT
Start: 2018-03-17 | End: 2018-03-24

## 2018-03-17 ASSESSMENT — ENCOUNTER SYMPTOMS
DIARRHEA: 0
VOMITING: 0
RHINORRHEA: 1
COUGH: 1
WHEEZING: 0
NAUSEA: 1
SORE THROAT: 1
SHORTNESS OF BREATH: 1

## 2018-03-17 NOTE — PROGRESS NOTES
pharynx) and posterior oropharyngeal erythema (slight erythema) present. No posterior oropharyngeal edema or tonsillar abscesses. Eyes: Conjunctivae are normal. Pupils are equal, round, and reactive to light. Right eye exhibits no discharge. Left eye exhibits no discharge. No scleral icterus. Cardiovascular: Normal rate, regular rhythm, S1 normal, S2 normal, normal heart sounds and intact distal pulses. Exam reveals no gallop and no friction rub. No murmur heard. Pulmonary/Chest: Effort normal and breath sounds normal. No accessory muscle usage. No respiratory distress. She has no decreased breath sounds. She has no wheezes. She has no rhonchi. She has no rales. Frequent, harsh, moist cough. Breath sounds clear B/L anterior and posterior lobes. Chest expansion symmetrical.  No audible wheezing or respiratory distress. No rales or rhonchi. Abdominal: Soft. Bowel sounds are normal. She exhibits no distension. There is no tenderness. Musculoskeletal: Normal range of motion. Lymphadenopathy:     She has cervical adenopathy. Right cervical: Superficial cervical adenopathy present. No posterior cervical adenopathy present. Left cervical: Superficial cervical adenopathy present. No posterior cervical adenopathy present. Neurological: She is alert and oriented to person, place, and time. Skin: Skin is warm and dry. No rash noted. She is not diaphoretic. No erythema. No pallor. Psychiatric: She has a normal mood and affect. Her behavior is normal.   Nursing note and vitals reviewed. BP (!) 138/98 (Site: Left Arm, Position: Sitting, Cuff Size: Medium Adult)   Temp 98.3 °F (36.8 °C) (Oral)   Ht 4' 11\" (1.499 m)   Wt 149 lb (67.6 kg)   SpO2 98%   BMI 30.09 kg/m²      POCT Influenza A/B - Negative A and Negative B    Assessment:     1. Acute non-recurrent maxillary sinusitis  amoxicillin-clavulanate (AUGMENTIN) 875-125 MG per tablet   2.  Bronchitis, acute, with bronchospasm  POCT

## 2018-03-17 NOTE — PATIENT INSTRUCTIONS
SURVEY:    You may be receiving a survey from [employer] regarding your visit today. Please complete the survey to enable us to provide the highest quality of care to you and your family. If you cannot score us a very good on any question, please call the office to discuss how we could of made your experience a very good one. Thank you. Patient Education   Patient Education   Patient Education   Patient Education   Patient Education          codeine and guaifenesin  Pronunciation:  FRANKIE garcia and carie HINES a sin  Brand:  Allfen CD, Cheracol with Codeine, Cheratussin AC, Codar GF, Duraganidin NR, Guaiatussin AC, Iophen-C NR, Mar-cof CG, M-Clear, Mytussin AC, Relcof C  What is the most important information I should know about codeine and guaifenesin? Codeine can slow or stop your breathing, and may be habit-forming. MISUSE OF THIS MEDICINE CAN CAUSE ADDICTION, OVERDOSE, OR DEATH, especially in a child or other person using the medicine without a prescription. Ask a doctor before giving this medicine to a child younger than 15years old. Do not give this medicine to anyone under 18 who recently had surgery to remove the tonsils or adenoids. What is codeine and guaifenesin? Codeine is a narcotic cough suppressant. It affects the signals in the brain that trigger cough reflex. Guaifenesin is an expectorant. It helps loosen congestion in your chest and throat, making it easier to cough out through your mouth. Codeine and guaifenesin is a combination medicine used to treat cough and chest congestion caused by allergies, the common cold, or the flu. This medicine will not treat a cough that is caused by smoking, asthma, or emphysema. Codeine and guaifenesin may also be used for purposes not listed in this medication guide. What should I discuss with my healthcare provider before taking codeine and guaifenesin? You should not take this medicine if you are allergic to codeine or guaifenesin.   In some information contained herein may be time sensitive. [facility] information has been compiled for use by healthcare practitioners and consumers in the [place] and therefore [employer] does not warrant that uses outside of the [place] are appropriate, unless specifically indicated otherwise. [facility] drug information does not endorse drugs, diagnose patients or recommend therapy. [facility] drug information is an informational resource designed to assist licensed healthcare practitioners in caring for their patients and/or to serve consumers viewing this service as a supplement to, and not a substitute for, the expertise, skill, knowledge and judgment of healthcare practitioners. The absence of a warning for a given drug or drug combination in no way should be construed to indicate that the drug or drug combination is safe, effective or appropriate for any given patient. [facility] does not assume any responsibility for any aspect of healthcare administered with the aid of information [facility] provides. The information contained herein is not intended to cover all possible uses, directions, precautions, warnings, drug interactions, allergic reactions, or adverse effects. If you have questions about the drugs you are taking, check with your doctor, nurse or pharmacist.  Copyright 4024-4373 [address]. Version: 8.01. Revision date: 6/27/2017. Care instructions adapted under license by [facility] ([facility]). If you have questions about a medical condition or this instruction, always ask your healthcare professional. [place] [address] any warranty or liability for your use of this information. benzonatate  Pronunciation:  heather keith  Brand:  Tessalon, Tessalon Perles, Zonatuss  What is the most important information I should know about benzonatate?   You should not use this medication if you are allergic to benzonatate or topical numbing medicines such as tetracaine or procaine (found in some doctor if you have any of these other conditions:  · liver disease (or a history of hepatitis or jaundice);  · kidney disease;  · mononucleosis; or  · if you are allergic to a cephalosporin antibiotic, such as cefdinir (Omnicef), cefprozil (Cefzil), cefuroxime (Ceftin), cephalexin (Keflex), and others. FDA pregnancy category B. This medication is not expected to be harmful to an unborn baby. Tell your doctor if you are pregnant or plan to become pregnant during treatment. Amoxicillin and clavulanate potassium can make birth control pills less effective. Ask your doctor about using a non-hormone method of birth control (such as a condom, diaphragm, spermicide) to prevent pregnancy while taking amoxicillin and clavulanate potassium. Amoxicillin and clavulanate potassium can pass into breast milk and may harm a nursing baby. Do not use this medication without telling your doctor if you are breast-feeding a baby. The liquid and chewable tablet forms of this medication may contain phenylalanine. Talk to your doctor before using these forms of amoxicillin and clavulanate potassium if you have phenylketonuria (PKU). How should I take amoxicillin and clavulanate potassium? Take exactly as prescribed by your doctor. Do not take in larger or smaller amounts or for longer than recommended. Follow the directions on your prescription label. If you switch from one tablet form to another (regular, chewable, or extended-release tablet), take only the new tablet form and strength prescribed for you. The strength of clavulanate potassium is not the same among the different tablet forms, even though the amount of amoxicillin may be the same as in the tablet you were using before. This medicine may not be as effective or could be harmful if you do not use the exact tablet form your doctor has prescribed. Take this medicine with a full glass of water. Take the medicine at the start of a meal to reduce stomach upset.   Take the medicines can cause diarrhea, which may be a sign of a new infection. If you have diarrhea that is watery or has blood in it, stop taking this medication and call your doctor. Do not use any medicine to stop the diarrhea unless your doctor has told you to. What are the possible side effects of amoxicillin and clavulanate potassium? Get emergency medical help if you have any of these signs of an allergic reaction: hives; difficulty breathing; swelling of your face, lips, tongue, or throat. Stop using this medicine and call your doctor at once if you have a serious side effect such as:  · diarrhea that is watery or has blood in it;  · pale or yellowed skin, dark colored urine, fever, confusion or weakness;  · easy bruising or bleeding;  · skin rash, bruising, severe tingling, numbness, pain, muscle weakness;  · agitation, confusion, unusual thoughts or behavior, seizure (convulsions);  · nausea, upper stomach pain, itching, loss of appetite, dark urine, ernst-colored stools, jaundice (yellowing of the skin or eyes); or  · severe skin reaction -- fever, sore throat, swelling in your face or tongue, burning in your eyes, skin pain, followed by a red or purple skin rash that spreads (especially in the face or upper body) and causes blistering and peeling. Less serious side effects may include:  · mild diarrhea, gas, stomach pain;  · nausea or vomiting;  · headache;  · skin rash or itching;  · white patches in your mouth or throat; or  · vaginal yeast infection (itching or discharge). This is not a complete list of side effects and others may occur. Call your doctor for medical advice about side effects. You may report side effects to FDA at 5-324-FDA-4775. What other drugs will affect amoxicillin and clavulanate potassium?   Tell your doctor about all other medications you use, especially:  · allopurinol (Zyloprim);  · probenecid (Benemid);  · a blood thinner such as warfarin (Coumadin, Jantoven); or  · another antibiotic (for the same or for a different infection). This list is not complete and other drugs may interact with amoxicillin and clavulanate potassium. Tell your doctor about all medications you use. This includes prescription, over-the-counter, vitamin, and herbal products. Do not start a new medication without telling your doctor. Where can I get more information? Your pharmacist can provide more information about amoxicillin and clavulanate potassium. Remember, keep this and all other medicines out of the reach of children, never share your medicines with others, and use this medication only for the indication prescribed. Every effort has been made to ensure that the information provided by [address] [place], [place] [address]can Dr is accurate, up-to-date, and complete, but no guarantee is made to that effect. Drug information contained herein may be time sensitive. [facility] information has been compiled for use by healthcare practitioners and consumers in the [place] and therefore [facility] does not warrant that uses outside of the [place] are appropriate, unless specifically indicated otherwise. [facility]'s drug information does not endorse drugs, diagnose patients or recommend therapy. [facility] drug information is an informational resource designed to assist licensed healthcare practitioners in caring for their patients and/or to serve consumers viewing this service as a supplement to, and not a substitute for, the expertise, skill, knowledge and judgment of healthcare practitioners. The absence of a warning for a given drug or drug combination in no way should be construed to indicate that the drug or drug combination is safe, effective or appropriate for any given patient. [facility] does not assume any responsibility for any aspect of healthcare administered with the aid of information [facility] provides.  The information contained herein is not intended to cover all possible uses, directions, precautions, and pressure in your head and face. In most cases, sinusitis gets better on its own in 1 to 2 weeks. But some mild symptoms may last for several weeks. Sometimes antibiotics are needed. Follow-up care is a key part of your treatment and safety. Be sure to make and go to all appointments, and call your doctor if you are having problems. It's also a good idea to know your test results and keep a list of the medicines you take. How can you care for yourself at home? · Take an over-the-counter pain medicine, such as acetaminophen (Tylenol), ibuprofen (Advil, Motrin), or naproxen (Aleve). Read and follow all instructions on the label. · If the doctor prescribed antibiotics, take them as directed. Do not stop taking them just because you feel better. You need to take the full course of antibiotics. · Be careful when taking over-the-counter cold or flu medicines and Tylenol at the same time. Many of these medicines have acetaminophen, which is Tylenol. Read the labels to make sure that you are not taking more than the recommended dose. Too much acetaminophen (Tylenol) can be harmful. · Breathe warm, moist air from a steamy shower, a hot bath, or a sink filled with hot water. Avoid cold, dry air. Using a humidifier in your home may help. Follow the directions for cleaning the machine. · Use saline (saltwater) nasal washes to help keep your nasal passages open and wash out mucus and bacteria. You can buy saline nose drops at a grocery store or drugstore. Or you can make your own at home by adding 1 teaspoon of salt and 1 teaspoon of baking soda to 2 cups of distilled water. If you make your own, fill a bulb syringe with the solution, insert the tip into your nostril, and squeeze gently. [employer] your nose. · Put a hot, wet towel or a warm gel pack on your face 3 or 4 times a day for 5 to 10 minutes each time. · Try a decongestant nasal spray like oxymetazoline (Afrin). Do not use it for more than 3 days in a row.  Using with PCP if symptoms worsen or do not improve    2. Bronchitis:  · Tessalon Perles as prescribed as needed for cough: swallow whole, no more than 3 doses in 24 hours and do not take with other cough meds. · Antibiotic as directed. Take until all doses are completed. · Probiotic or greek yogurt daily while on antibiotic. · Cheratussin as prescribed as needed for activity. · Practice meticulous handwashing and cover cough to prevent spread of infection  · Encouraged to increase fluids and rest  · Aleve/Ibuprofen/Tylenol OTC PRN for pain, discomfort or fever as directed on package. Take with food. · Cool mist humidifier  · Hot tea with honey and lemon for cough PRN  · Patient instructions given for acute bronchitis, augmentin, tessalon perles and cheratussin. · To ER or call 911 if any difficulty breathing, shortness of breath, inability to swallow, hives, rash, facial/tongue swelling or temp greater than 103 degrees. · Follow up with PCP or [facility] as needed if symptoms worsen or do not improve.

## 2018-03-20 ENCOUNTER — EMPLOYEE WELLNESS (OUTPATIENT)
Dept: OTHER | Age: 53
End: 2018-03-20

## 2018-03-20 LAB
CHOLESTEROL/HDL RATIO: 4
CHOLESTEROL: 256 MG/DL
GLUCOSE BLD-MCNC: 95 MG/DL (ref 70–99)
HDLC SERPL-MCNC: 64 MG/DL
INFLUENZA A ANTIBODY: NORMAL
INFLUENZA B ANTIBODY: NORMAL
LDL CHOLESTEROL: 173 MG/DL (ref 0–130)
PATIENT FASTING?: YES
TRIGL SERPL-MCNC: 94 MG/DL
VLDLC SERPL CALC-MCNC: ABNORMAL MG/DL (ref 1–30)

## 2018-03-21 ENCOUNTER — HOSPITAL ENCOUNTER (OUTPATIENT)
Age: 53
Discharge: HOME | End: 2018-03-23
Payer: COMMERCIAL

## 2018-03-21 ENCOUNTER — HOSPITAL ENCOUNTER (OUTPATIENT)
Dept: GENERAL RADIOLOGY | Age: 53
Discharge: HOME | End: 2018-03-23
Payer: COMMERCIAL

## 2018-03-21 DIAGNOSIS — R05.8 PRODUCTIVE COUGH: ICD-10-CM

## 2018-03-21 DIAGNOSIS — R06.02 SOB (SHORTNESS OF BREATH): ICD-10-CM

## 2018-03-21 PROCEDURE — 71046 X-RAY EXAM CHEST 2 VIEWS: CPT

## 2018-03-26 ENCOUNTER — OFFICE VISIT (OUTPATIENT)
Dept: FAMILY MEDICINE CLINIC | Age: 53
End: 2018-03-26
Payer: COMMERCIAL

## 2018-03-26 VITALS
DIASTOLIC BLOOD PRESSURE: 86 MMHG | OXYGEN SATURATION: 96 % | HEART RATE: 68 BPM | SYSTOLIC BLOOD PRESSURE: 124 MMHG | WEIGHT: 148 LBS | BODY MASS INDEX: 29.89 KG/M2

## 2018-03-26 DIAGNOSIS — F41.9 ANXIETY: ICD-10-CM

## 2018-03-26 DIAGNOSIS — Z12.39 BREAST CANCER SCREENING: ICD-10-CM

## 2018-03-26 DIAGNOSIS — E78.2 MIXED HYPERLIPIDEMIA: Primary | ICD-10-CM

## 2018-03-26 DIAGNOSIS — K58.2 IRRITABLE BOWEL SYNDROME WITH BOTH CONSTIPATION AND DIARRHEA: ICD-10-CM

## 2018-03-26 DIAGNOSIS — G43.909 MIGRAINE WITHOUT STATUS MIGRAINOSUS, NOT INTRACTABLE, UNSPECIFIED MIGRAINE TYPE: ICD-10-CM

## 2018-03-26 PROCEDURE — 99213 OFFICE O/P EST LOW 20 MIN: CPT | Performed by: FAMILY MEDICINE

## 2018-03-26 RX ORDER — TIZANIDINE 4 MG/1
8 TABLET ORAL NIGHTLY
Qty: 180 TABLET | Refills: 1 | Status: SHIPPED | OUTPATIENT
Start: 2018-03-26 | End: 2019-04-18 | Stop reason: SDUPTHER

## 2018-03-26 RX ORDER — PANTOPRAZOLE SODIUM 40 MG/1
40 TABLET, DELAYED RELEASE ORAL
Qty: 90 TABLET | Refills: 1 | Status: SHIPPED | OUTPATIENT
Start: 2018-03-26 | End: 2019-12-06

## 2018-03-26 RX ORDER — ELETRIPTAN HYDROBROMIDE 40 MG/1
40 TABLET, FILM COATED ORAL
Qty: 12 TABLET | Refills: 3 | Status: SHIPPED | OUTPATIENT
Start: 2018-03-26 | End: 2018-09-09 | Stop reason: SDUPTHER

## 2018-03-26 ASSESSMENT — ENCOUNTER SYMPTOMS
EYE REDNESS: 0
EYE PAIN: 0
BLOOD IN STOOL: 0
SORE THROAT: 0
EYE DISCHARGE: 0
SHORTNESS OF BREATH: 0
FACIAL SWELLING: 0
NAUSEA: 0
VOMITING: 0
ABDOMINAL PAIN: 0
COUGH: 0
BLURRED VISION: 0
SINUS PRESSURE: 0
CONSTIPATION: 0
DIARRHEA: 0

## 2018-03-26 NOTE — PATIENT INSTRUCTIONS
SURVEY:    You may be receiving a survey from [employer] regarding your visit today. Please complete the survey to enable us to provide the highest quality of care to you and your family. If you cannot score us a very good on any question, please call the office to discuss how we could have made your experience a very good one. Thank you.

## 2018-03-26 NOTE — PROGRESS NOTES
family history. Review of Systems:       Review of Systems   Constitutional: Negative for chills, fatigue, fever, unexpected weight change and weight loss. HENT: Negative for congestion, facial swelling, sinus pressure and sore throat. Eyes: Negative for blurred vision, pain, discharge and redness. Respiratory: Negative for cough and shortness of breath. Cardiovascular: Negative for chest pain, palpitations and leg swelling. Gastrointestinal: Negative for abdominal pain, blood in stool, constipation, diarrhea, nausea and vomiting. Genitourinary: Negative for difficulty urinating, dysuria and hematuria. Musculoskeletal: Negative for joint swelling and neck stiffness. Skin: Negative for pallor and rash. Neurological: Positive for headaches. Negative for tremors, seizures, facial asymmetry and light-headedness. Psychiatric/Behavioral: Negative for confusion and sleep disturbance. Physical Exam:     Physical Exam   Constitutional: She is oriented to person, place, and time. She appears well-developed and well-nourished. HENT:   Head: Normocephalic and atraumatic. Eyes: Conjunctivae are normal. Pupils are equal, round, and reactive to light. Right eye exhibits no discharge. Left eye exhibits no discharge. Neck: Neck supple. No thyromegaly present. Cardiovascular: Normal rate, regular rhythm and normal heart sounds. No murmur heard. Pulmonary/Chest: Effort normal and breath sounds normal. No respiratory distress. She has no wheezes. Abdominal: Soft. Bowel sounds are normal. She exhibits no distension. There is no tenderness. Musculoskeletal: She exhibits no edema. Lymphadenopathy:     She has no cervical adenopathy. Neurological: She is alert and oriented to person, place, and time. No cranial nerve deficit. Skin: Skin is warm and dry. No rash noted. No erythema. Psychiatric: She has a normal mood and affect. Her behavior is normal.   Vitals reviewed.       Vitals:

## 2018-04-02 ENCOUNTER — CARE COORDINATION (OUTPATIENT)
Dept: CARE COORDINATION | Age: 53
End: 2018-04-02

## 2018-04-02 VITALS — BODY MASS INDEX: 28.48 KG/M2 | WEIGHT: 141 LBS

## 2018-04-09 ENCOUNTER — HOSPITAL ENCOUNTER (OUTPATIENT)
Dept: MAMMOGRAPHY | Age: 53
Discharge: HOME | End: 2018-04-11
Payer: COMMERCIAL

## 2018-04-09 DIAGNOSIS — Z12.39 BREAST CANCER SCREENING: ICD-10-CM

## 2018-04-09 PROCEDURE — 77067 SCR MAMMO BI INCL CAD: CPT

## 2018-05-15 ENCOUNTER — CARE COORDINATION (OUTPATIENT)
Dept: CARE COORDINATION | Age: 53
End: 2018-05-15

## 2018-07-06 ENCOUNTER — CARE COORDINATION (OUTPATIENT)
Dept: CARE COORDINATION | Age: 53
End: 2018-07-06

## 2018-07-24 ENCOUNTER — CARE COORDINATION (OUTPATIENT)
Dept: CARE COORDINATION | Age: 53
End: 2018-07-24

## 2018-07-24 NOTE — CARE COORDINATION
in 2 hours if necessary (no more than 2 pills in 24 hours) 3/26/18 3/26/18  Enid Gutierrez MD   topiramate (TOPAMAX) 50 MG tablet Take 1 tablet by mouth 2 times daily 3/6/18   Enid Gutierrez MD   escitalopram (LEXAPRO) 20 MG tablet TAKE 1 TABLET BY MOUTH DAILY 12/7/17   Enid Gutierrez MD   eletriptan (RELPAX) 40 MG tablet Take 40 mg by mouth once as needed may repeat in 2 hours if necessary    Historical Provider, MD   ibuprofen (ADVIL;MOTRIN) 800 MG tablet Take 1 tablet by mouth every 8 hours as needed for Pain 5/11/17   Shirley Arauz, DO   aspirin EC 81 MG EC tablet Take 1 tablet by mouth daily 8/1/16   Gilmore Halsted, DO       No future appointments.

## 2018-08-10 ENCOUNTER — CARE COORDINATION (OUTPATIENT)
Dept: CARE COORDINATION | Age: 53
End: 2018-08-10

## 2018-08-13 ENCOUNTER — CARE COORDINATION (OUTPATIENT)
Dept: CARE COORDINATION | Age: 53
End: 2018-08-13

## 2018-08-14 NOTE — CARE COORDINATION
Registered Dietitian Initial Assessment for Care Coordination      Name-Sadie Perea  August 14, 2018    Initial Referral Reason: hyperlipidemia    Patient Care Team:  Lynda Hernandez MD as PCP - General (Family Medicine)  Lynda Hernandez MD as PCP - [facility] Attributed Provider  Celi Manzo RN as Care Coordinator  Jag Moran RD, LD as Dietitian    Patient Active Problem List   Diagnosis    Anemia    Panic attacks    Anxiety    Radiculopathy, cervical region    Paresthesia and pain of both upper extremities    Arthropathy of cervical facet joint ([address]    Migraine without status migrainosus, not intractable    Mixed hyperlipidemia    Precordial pain       Current Outpatient Prescriptions   Medication Sig Dispense Refill    pantoprazole (PROTONIX) 40 MG tablet Take 1 tablet by mouth every morning (before breakfast) 90 tablet 1    tiZANidine (ZANAFLEX) 4 MG tablet Take 2 tablets by mouth nightly 180 tablet 1    eletriptan (RELPAX) 40 MG tablet Take 1 tablet by mouth once as needed (migraine) may repeat in 2 hours if necessary (no more than 2 pills in 24 hours) 12 tablet 3    topiramate (TOPAMAX) 50 MG tablet Take 1 tablet by mouth 2 times daily 180 tablet 1    escitalopram (LEXAPRO) 20 MG tablet TAKE 1 TABLET BY MOUTH DAILY 90 tablet 1    eletriptan (RELPAX) 40 MG tablet Take 40 mg by mouth once as needed may repeat in 2 hours if necessary      ibuprofen (ADVIL;MOTRIN) 800 MG tablet Take 1 tablet by mouth every 8 hours as needed for Pain 30 tablet 0    aspirin EC 81 MG EC tablet Take 1 tablet by mouth daily 90 tablet 3     Current Facility-Administered Medications   Medication Dose Route Frequency Provider Last Rate Last Dose    promethazine (PHENERGAN) injection 25 mg  25 mg Intramuscular Q6H PRN Lynda Hernandez MD   25 mg at 04/05/16 1601         Visit for:  Obesity/Weight loss  Diabetes:  Hypertension:  Hyperlipidemia: X  Other:     Anthropometric Measurements:  HT: 5'  Weight: 148  IBW: 100 + or - 10%  BMI: 28.5    Biochemical Data, Medical Tests and Procedures:    No results found for: LABA1C  No results found for: EAG    Lab Results   Component Value Date    CHOL 256 (H) 03/20/2018    CHOL 233 (H) 04/07/2017    CHOL 222 (H) 07/22/2015     Lab Results   Component Value Date    TRIG 94 03/20/2018    TRIG 133 04/07/2017    TRIG 90 07/22/2015     Lab Results   Component Value Date    HDL 64 03/20/2018    HDL 58 04/07/2017    HDL 63 07/22/2015     Lab Results   Component Value Date    LDLCHOLESTEROL 173 (H) 03/20/2018    LDLCHOLESTEROL 148 (H) 04/07/2017    LDLCHOLESTEROL 141 (H) 07/22/2015     Lab Results   Component Value Date    VLDL NOT REPORTED 03/20/2018    VLDL 27 04/07/2017    VLDL 18 07/22/2015     Lab Results   Component Value Date    CHOLHDLRATIO 4.0 03/20/2018    CHOLHDLRATIO 4.0 04/07/2017    CHOLHDLRATIO 3.5 07/22/2015       Lab Results   Component Value Date    WBC 5.5 11/29/2016    HGB 13.1 11/29/2016    HCT 39.4 11/29/2016    MCV 90.6 11/29/2016     11/29/2016       Lab Results   Component Value Date    CREATININE 0.88 11/29/2016    BUN 20 11/29/2016     11/29/2016    K 4.5 11/29/2016     11/29/2016    CO2 21 11/29/2016         NUTRITION DIAGNOSIS    #1 Problem  Food and nutrition-related knowledge deficit       Etiology  related to lack of knowledge of foods high in fat/cholesterol       Signs/Symptoms  as evidenced by LDL-173    NUTRITION INTERVENTION  Nutrition Prescription:    low fat, low cholesterol diet providing 1400 kcals/day  Protein needs: 50gms/d  Fluid needs: 2000cc/day      Patient Goals:  1. Patient will learn how to read food labels to limit saturated, trans fat and cholesterol. Goal is <12gms of saturated fat/d, <2gms of trans fat and <100mg of cholesterol/day. 2. Patient will increase fiber intake, goal is 25-35gms of fiber/day.     Nutrition Intervention Need:  Initial/Brief:  Comprehensive Nutrition Education: X  Coordination of other care

## 2018-08-17 ENCOUNTER — CARE COORDINATION (OUTPATIENT)
Dept: CARE COORDINATION | Age: 53
End: 2018-08-17

## 2018-08-17 RX ORDER — ESCITALOPRAM OXALATE 20 MG/1
TABLET ORAL
Qty: 90 TABLET | Refills: 1 | Status: SHIPPED | OUTPATIENT
Start: 2018-08-17 | End: 2019-04-18 | Stop reason: SDUPTHER

## 2018-08-17 NOTE — TELEPHONE ENCOUNTER
Last OV 3/26/18 for her check up, anxiety  Requesting refill on lexapro thru [facility]  No future apt scheduled at this time

## 2018-08-28 ENCOUNTER — CARE COORDINATION (OUTPATIENT)
Dept: CARE COORDINATION | Age: 53
End: 2018-08-28

## 2018-08-28 NOTE — CARE COORDINATION
Nutrition Care Coordinator Follow-Up visit:    Food Recall:Eating 3 meals/day  Activity Level:  Sedentary:  Lightly Active: X  Moderately Active:  Very Active:    Adult BMI:  Underweight (below 18.5)  Normal Weight (18.5-24.9)  Overweight (25-29. 9) X  Obese (30-39. 9)  Morbidly Obese (>40)    Weight Change: none noted    Plan:  Plan was established with patient:  Increase dietary fiber by consuming whole grains, fruits and vegetables: X  Limit dietary cholesterol to >200mg/day: X  Increase water intake:  Avoid added sugar:  Avoid sweetened beverages:  Choose lean meats: X    Monitoring: Will monitor weight:  Will monitor adherence to meal plan: X  Will monitor adherence to exercise plan: Will monitor HGA1c:    Handouts Provided :  Low Carb snacking:  Carb counting /individual meal plan:  Portion Control:  Food Labels: X  Physical Activity:  Low Fat/Cholesterol: X  Hypo/Hyperglycemia:  Calorie Controlled Meal Plan:  High fiber foods: X    Goals: Increase water consumption to 8oz. 6-8 times daily:  Manage blood sugars by consuming 3 meals spaced every 4-5 hours with 2-3 snacks daily:  Increase fiber and decrease fat intake by consuming 1-2 fruit servings and 2-3 vegetable servings per day. Discussed/reviewed  Increase physical activity by: encouraged daily walking as tolerated  Consume less than 2,000mg of sodium/day  Avoid consumption of sweetened beverages and added sugar by reading food labels:  Monitor blood sugars by using meter to check blood glucose before morning meal and 2 hours after a meal daily:  Decrease risk of coronary heart disease by consuming fish that contains omega-3 fatty acids at least twice a week, avoiding partially hydrogenated oil/trans fats and limiting saturated fat intake by reading food labels: Discussed    Patient goals set:  1. Patient is reading  read food labels and limiting saturated, trans fat and cholesterol.   Goal is <12gms of saturated fat/d, <2gms of trans fat and <100mg of cholesterol/day. Patient relays she is doing well with this, cut out processed foods and red meat. 2. Discussed high fiber foods again, will mail patient list of high fiber foods. Patient will increase fiber intake, goal is 25-35gms of fiber/day. 3. Patient has started walking, goal is to slowly work up to 10,000 steps 4 days/week. Will follow up in 3-4 weeks to review and answer questions.          Fanny Vela

## 2018-09-09 ENCOUNTER — HOSPITAL ENCOUNTER (EMERGENCY)
Age: 53
Discharge: HOME | End: 2018-09-09
Attending: EMERGENCY MEDICINE
Payer: COMMERCIAL

## 2018-09-09 VITALS
BODY MASS INDEX: 29.88 KG/M2 | OXYGEN SATURATION: 98 % | RESPIRATION RATE: 14 BRPM | SYSTOLIC BLOOD PRESSURE: 154 MMHG | TEMPERATURE: 98.5 F | WEIGHT: 147.93 LBS | DIASTOLIC BLOOD PRESSURE: 79 MMHG | HEART RATE: 66 BPM

## 2018-09-09 DIAGNOSIS — G43.811 OTHER MIGRAINE WITH STATUS MIGRAINOSUS, INTRACTABLE: Primary | ICD-10-CM

## 2018-09-09 LAB
ABSOLUTE EOS #: 0.1 K/UL (ref 0–0.4)
ABSOLUTE IMMATURE GRANULOCYTE: NORMAL K/UL (ref 0–0.3)
ABSOLUTE LYMPH #: 2.1 K/UL (ref 1–4.8)
ABSOLUTE MONO #: 0.5 K/UL (ref 0–1)
ALBUMIN SERPL-MCNC: 4.6 G/DL (ref 3.5–5.2)
ALBUMIN/GLOBULIN RATIO: ABNORMAL (ref 1–2.5)
ALP BLD-CCNC: 95 U/L (ref 35–104)
ALT SERPL-CCNC: 30 U/L (ref 5–33)
ANION GAP SERPL CALCULATED.3IONS-SCNC: 14 MMOL/L (ref 9–17)
AST SERPL-CCNC: 25 U/L
BASOPHILS # BLD: 1 % (ref 0–2)
BASOPHILS ABSOLUTE: 0 K/UL (ref 0–0.2)
BILIRUB SERPL-MCNC: 0.34 MG/DL (ref 0.3–1.2)
BUN BLDV-MCNC: 10 MG/DL (ref 6–20)
BUN/CREAT BLD: 13 (ref 9–20)
CALCIUM SERPL-MCNC: 9.4 MG/DL (ref 8.6–10.4)
CHLORIDE BLD-SCNC: 101 MMOL/L (ref 98–107)
CO2: 24 MMOL/L (ref 20–31)
CREAT SERPL-MCNC: 0.79 MG/DL (ref 0.5–0.9)
DIFFERENTIAL TYPE: YES
EOSINOPHILS RELATIVE PERCENT: 1 % (ref 0–5)
GFR AFRICAN AMERICAN: >60 ML/MIN
GFR NON-AFRICAN AMERICAN: >60 ML/MIN
GFR SERPL CREATININE-BSD FRML MDRD: ABNORMAL ML/MIN/{1.73_M2}
GFR SERPL CREATININE-BSD FRML MDRD: ABNORMAL ML/MIN/{1.73_M2}
GLUCOSE BLD-MCNC: 112 MG/DL (ref 70–99)
HCT VFR BLD CALC: 42.2 % (ref 36–46)
HEMOGLOBIN: 13.9 G/DL (ref 12–16)
IMMATURE GRANULOCYTES: NORMAL %
LYMPHOCYTES # BLD: 32 % (ref 15–40)
MCH RBC QN AUTO: 29.7 PG (ref 26–34)
MCHC RBC AUTO-ENTMCNC: 32.9 G/DL (ref 31–37)
MCV RBC AUTO: 90.3 FL (ref 80–100)
MONOCYTES # BLD: 8 % (ref 4–8)
NRBC AUTOMATED: NORMAL PER 100 WBC
PDW BLD-RTO: 13.9 % (ref 12.1–15.2)
PLATELET # BLD: 250 K/UL (ref 140–450)
PLATELET ESTIMATE: NORMAL
PMV BLD AUTO: NORMAL FL (ref 6–12)
POTASSIUM SERPL-SCNC: 3.9 MMOL/L (ref 3.7–5.3)
RBC # BLD: 4.68 M/UL (ref 4–5.2)
RBC # BLD: NORMAL 10*6/UL
SEG NEUTROPHILS: 58 % (ref 47–75)
SEGMENTED NEUTROPHILS ABSOLUTE COUNT: 3.9 K/UL (ref 2.5–7)
SODIUM BLD-SCNC: 139 MMOL/L (ref 135–144)
TOTAL PROTEIN: 7.5 G/DL (ref 6.4–8.3)
WBC # BLD: 6.7 K/UL (ref 3.5–11)
WBC # BLD: NORMAL 10*3/UL

## 2018-09-09 PROCEDURE — 6360000002 HC RX W HCPCS: Performed by: EMERGENCY MEDICINE

## 2018-09-09 PROCEDURE — 96375 TX/PRO/DX INJ NEW DRUG ADDON: CPT

## 2018-09-09 PROCEDURE — 96374 THER/PROPH/DIAG INJ IV PUSH: CPT

## 2018-09-09 PROCEDURE — 85025 COMPLETE CBC W/AUTO DIFF WBC: CPT

## 2018-09-09 PROCEDURE — 2580000003 HC RX 258: Performed by: EMERGENCY MEDICINE

## 2018-09-09 PROCEDURE — 80053 COMPREHEN METABOLIC PANEL: CPT

## 2018-09-09 PROCEDURE — 99283 EMERGENCY DEPT VISIT LOW MDM: CPT

## 2018-09-09 PROCEDURE — 6370000000 HC RX 637 (ALT 250 FOR IP): Performed by: EMERGENCY MEDICINE

## 2018-09-09 RX ORDER — ONDANSETRON 4 MG/1
8 TABLET, ORALLY DISINTEGRATING ORAL ONCE
Status: COMPLETED | OUTPATIENT
Start: 2018-09-09 | End: 2018-09-09

## 2018-09-09 RX ORDER — NALBUPHINE HCL 10 MG/ML
10 AMPUL (ML) INJECTION ONCE
Status: COMPLETED | OUTPATIENT
Start: 2018-09-09 | End: 2018-09-09

## 2018-09-09 RX ORDER — HYDROCODONE BITARTRATE AND ACETAMINOPHEN 5; 325 MG/1; MG/1
2 TABLET ORAL ONCE
Status: COMPLETED | OUTPATIENT
Start: 2018-09-09 | End: 2018-09-09

## 2018-09-09 RX ORDER — SODIUM CHLORIDE 9 MG/ML
INJECTION, SOLUTION INTRAVENOUS ONCE
Status: COMPLETED | OUTPATIENT
Start: 2018-09-09 | End: 2018-09-09

## 2018-09-09 RX ADMIN — HYDROCODONE BITARTRATE AND ACETAMINOPHEN 2 TABLET: 5; 325 TABLET ORAL at 20:11

## 2018-09-09 RX ADMIN — PROCHLORPERAZINE EDISYLATE 10 MG: 5 INJECTION INTRAMUSCULAR; INTRAVENOUS at 17:43

## 2018-09-09 RX ADMIN — NALBUPHINE HYDROCHLORIDE 10 MG: 10 INJECTION, SOLUTION INTRAMUSCULAR; INTRAVENOUS; SUBCUTANEOUS at 17:44

## 2018-09-09 RX ADMIN — SODIUM CHLORIDE: 9 INJECTION, SOLUTION INTRAVENOUS at 17:00

## 2018-09-09 RX ADMIN — ONDANSETRON 8 MG: 4 TABLET, ORALLY DISINTEGRATING ORAL at 20:11

## 2018-09-09 ASSESSMENT — PAIN SCALES - GENERAL
PAINLEVEL_OUTOF10: 10
PAINLEVEL_OUTOF10: 5
PAINLEVEL_OUTOF10: 4
PAINLEVEL_OUTOF10: 3

## 2018-09-09 ASSESSMENT — PAIN DESCRIPTION - DESCRIPTORS: DESCRIPTORS: ACHING

## 2018-09-09 ASSESSMENT — PAIN DESCRIPTION - FREQUENCY: FREQUENCY: CONTINUOUS

## 2018-09-09 ASSESSMENT — PAIN DESCRIPTION - PAIN TYPE: TYPE: ACUTE PAIN

## 2018-09-09 ASSESSMENT — PAIN DESCRIPTION - LOCATION: LOCATION: HEAD

## 2018-09-09 NOTE — ED PROVIDER NOTES
eMERGENCY dEPARTMENT eNCOUnter        [facility]    Chief Complaint   Patient presents with    Headache    Nausea    Emesis       [facility]    Sonia Candelario is a 47 y.o. female who presents to ED from home. By car  With complaint of headache. Onset today. Intensity of symptoms moderate. Patient has history of migraine headaches. Patient states that this is a pretty typical headache for her. Patient denies abrupt onset of the headache denies worse at pulse of headache. Patient reports nausea and dry heaving at home.       REVIEW OF SYSTEMS    All systems reviewed and positives are in the HPI      PAST MEDICAL HISTORY    Past Medical History:   Diagnosis Date    Anemia as a teen    Cerebral artery occlusion with cerebral infarction ([facility] [address]     \"mini stroke\"    Crohn's     Headache, classical migraine     migraines    Hyperlipidemia     Nausea & vomiting     Problems with head, neck, and trunk        SURGICAL HISTORY    Past Surgical History:   Procedure Laterality Date    BACK SURGERY      cervical spine injections    BLADDER SURGERY  1972    CARPAL TUNNEL RELEASE Right     right dequervains also    CERVICAL SPINE SURGERY  2004    HYSTERECTOMY, TOTAL ABDOMINAL  2001    OVARY REMOVAL  2008    SHOULDER SURGERY  2003       CURRENT MEDICATIONS    Current Outpatient Rx   Medication Sig Dispense Refill    escitalopram (LEXAPRO) 20 MG tablet TAKE ONE TABLET BY MOUTH ONE TIME A DAY 90 tablet 1    tiZANidine (ZANAFLEX) 4 MG tablet Take 2 tablets by mouth nightly 180 tablet 1    topiramate (TOPAMAX) 50 MG tablet Take 1 tablet by mouth 2 times daily 180 tablet 1    eletriptan (RELPAX) 40 MG tablet Take 40 mg by mouth once as needed may repeat in 2 hours if necessary      ibuprofen (ADVIL;MOTRIN) 800 MG tablet Take 1 tablet by mouth every 8 hours as needed for Pain 30 tablet 0    aspirin EC 81 MG EC tablet Take 1 tablet by mouth daily 90 tablet 3    pantoprazole (PROTONIX) 40 MG tablet Take 1 tablet by

## 2018-09-12 ENCOUNTER — CARE COORDINATION (OUTPATIENT)
Dept: CARE COORDINATION | Age: 53
End: 2018-09-12

## 2018-09-21 ENCOUNTER — CARE COORDINATION (OUTPATIENT)
Dept: CARE COORDINATION | Age: 53
End: 2018-09-21

## 2018-09-26 ENCOUNTER — CARE COORDINATION (OUTPATIENT)
Dept: CARE COORDINATION | Age: 53
End: 2018-09-26

## 2018-09-26 NOTE — CARE COORDINATION
Left message to return call to [phone] for nutrition care coordination follow up. Patient unavailable.

## 2018-10-03 ENCOUNTER — CARE COORDINATION (OUTPATIENT)
Dept: CARE COORDINATION | Age: 53
End: 2018-10-03

## 2018-10-05 ENCOUNTER — CARE COORDINATION (OUTPATIENT)
Dept: CARE COORDINATION | Age: 53
End: 2018-10-05

## 2018-10-05 NOTE — CARE COORDINATION
Nutrition Care Coordinator Follow-Up visit:    Food Recall: eating 3 meals/day    Activity Level:  Sedentary:  Lightly Active: X  Moderately Active:  Very Active:    Adult BMI:  Underweight (below 18.5)  Normal Weight (18.5-24.9)  Overweight (25-29. 9) X  Obese (30-39. 9)  Morbidly Obese (>40)    Weight Change: none noted    Plan:  Plan was established with patient:  Increase dietary fiber by consuming whole grains, fruits and vegetables: X  Limit dietary cholesterol to >200mg/day: X  Increase water intake:  Avoid added sugar:  Avoid sweetened beverages:  Choose lean meats: X      Monitoring: Will monitor weight:  Will monitor adherence to meal plan: X  Will monitor adherence to exercise plan: Will monitor HGA1c:    Handouts Provided :  Low Carb snacking:  Carb counting /individual meal plan:  Portion Control:  Food Labels: X  Physical Activity:  Low Fat/Cholesterol: X  Hypo/Hyperglycemia:  Calorie Controlled Meal Plan:    Goals: Increase water consumption to 8oz. 6-8 times daily:  Manage blood sugars by consuming 3 meals spaced every 4-5 hours with 2-3 snacks daily:  Increase fiber and decrease fat intake by consuming 1-2 fruit servings and 2-3 vegetable servings per day. Reviewed  Increase physical activity by:  Consume less than 2,000mg of sodium/day:   Avoid consumption of sweetened beverages and added sugar by reading food labels:  Monitor blood sugars by using meter to check blood glucose before morning meal and 2 hours after a meal daily:  Decrease risk of coronary heart disease by consuming fish that contains omega-3 fatty acids at least twice a week, avoiding partially hydrogenated oil/trans fats and limiting saturated fat intake by reading food labels: Reviewed    Patient goals set:  1. Reviewed guidelines for limiting fats and cholesterol, goal is <12gms of saturated fat/d, <2gms of trans fat and <100mg of cholesterol/day.  Patient relays she is using food labels to avoid foods high in fat and

## 2018-10-12 ENCOUNTER — CARE COORDINATION (OUTPATIENT)
Dept: CARE COORDINATION | Age: 53
End: 2018-10-12

## 2018-10-29 ENCOUNTER — CARE COORDINATION (OUTPATIENT)
Dept: CARE COORDINATION | Age: 53
End: 2018-10-29

## 2018-10-30 ENCOUNTER — CARE COORDINATION (OUTPATIENT)
Dept: CARE COORDINATION | Age: 53
End: 2018-10-30

## 2018-10-30 NOTE — CARE COORDINATION
Left message to return call to [phone] for nutrition care coordination follow up. Patient unavailable. Plan: will attempt to reach patient again in 1-2 weeks if no return call.

## 2018-10-31 ENCOUNTER — CARE COORDINATION (OUTPATIENT)
Dept: CARE COORDINATION | Age: 53
End: 2018-10-31

## 2018-11-05 ENCOUNTER — CARE COORDINATION (OUTPATIENT)
Dept: CARE COORDINATION | Age: 53
End: 2018-11-05

## 2018-11-05 NOTE — CARE COORDINATION
Patient removed from Dietician follow up list.   Will forward to Dr. Black Sotelo for Care coordination discharge approval.   Patient does not need surveillance monitoring. Patient independent in her care. No further Care coordination needs.
hyperlipidemia  Counseled pt on low saturated and trans fat diet. Reviewed cholesterol and age as risk factors. Pt will try diet and thierry labs at the [facility] and drop off results     2. Migraine without status migrainosus, not intractable, unspecified migraine type  Stable and pt takes topamax daily and relpax as needed. Pt takes zanaflex at night for neck pain which contributes to her migraines. Pt has seen in the past her neurosurgeon and no surgery at this time     3. Irritable bowel syndrome with both constipation and diarrhea  Stable and pt monitors diet.      4. Anxiety  Stable on the lexapro     f/u yearly     CC Plan: Will send patient for care coordination discharge with no further CC needs. Care Coordination Interventions    Program Enrollment:  Complex Care  Referral from Primary Care Provider:  No  Suggested Interventions and Community Resources  Registered Dietician:  Completed  Specialty Services Referral:  In Process (Comment: considering pain doc referral)         Goals Addressed             Most Recent     COMPLETED: Conditions and Symptoms   Improving (9/21/2018)             I will schedule office visits, as directed by my provider. I will keep my appointment or reschedule if I have to cancel. I will notify my provider of any barriers to my plan of care. I will notify my provider of any symptoms that indicate a worsening of my condition. Patient to notify MD office if needs assistance with referral to neurosurgery if migraines would start to increase again. Currently Migraines controlled. Patient to work on modifying diet/ focus on low cholesterol/low fat. [facility] dietician to reach out and work with patient.     Barriers: stress and time constraints  Plan for overcoming my barriers: patient to make time for herself in her busy work schedule; keep follow ups with PCP, and follow up with specialist as directed  Confidence: 8/10  Anticipated Goal Completion Date: 11/01/18

## 2019-03-04 ENCOUNTER — OFFICE VISIT (OUTPATIENT)
Dept: PRIMARY CARE CLINIC | Age: 54
End: 2019-03-04
Payer: COMMERCIAL

## 2019-03-04 VITALS
SYSTOLIC BLOOD PRESSURE: 143 MMHG | WEIGHT: 141 LBS | DIASTOLIC BLOOD PRESSURE: 86 MMHG | OXYGEN SATURATION: 97 % | HEART RATE: 74 BPM | BODY MASS INDEX: 28.48 KG/M2 | TEMPERATURE: 98 F

## 2019-03-04 DIAGNOSIS — J11.1 INFLUENZA: Primary | ICD-10-CM

## 2019-03-04 DIAGNOSIS — R05.9 COUGH: ICD-10-CM

## 2019-03-04 LAB
INFLUENZA A ANTIBODY: NEGATIVE
INFLUENZA B ANTIBODY: NEGATIVE

## 2019-03-04 PROCEDURE — 99213 OFFICE O/P EST LOW 20 MIN: CPT | Performed by: NURSE PRACTITIONER

## 2019-03-04 PROCEDURE — 87804 INFLUENZA ASSAY W/OPTIC: CPT | Performed by: NURSE PRACTITIONER

## 2019-03-04 RX ORDER — OSELTAMIVIR PHOSPHATE 75 MG/1
75 CAPSULE ORAL 2 TIMES DAILY
Qty: 10 CAPSULE | Refills: 0 | Status: SHIPPED | OUTPATIENT
Start: 2019-03-04 | End: 2019-03-09

## 2019-03-04 RX ORDER — BENZONATATE 100 MG/1
100 CAPSULE ORAL 3 TIMES DAILY PRN
Qty: 21 CAPSULE | Refills: 0 | Status: SHIPPED | OUTPATIENT
Start: 2019-03-04 | End: 2019-03-11

## 2019-03-04 RX ORDER — GUAIFENESIN AND CODEINE PHOSPHATE 100; 10 MG/5ML; MG/5ML
10 SOLUTION ORAL 4 TIMES DAILY PRN
Qty: 120 ML | Refills: 0 | Status: SHIPPED | OUTPATIENT
Start: 2019-03-04 | End: 2019-03-09

## 2019-03-04 ASSESSMENT — ENCOUNTER SYMPTOMS
VOMITING: 0
COUGH: 1
SHORTNESS OF BREATH: 1
RHINORRHEA: 1
SORE THROAT: 0
WHEEZING: 1
DIARRHEA: 1
NAUSEA: 1

## 2019-04-10 ENCOUNTER — EMPLOYEE WELLNESS (OUTPATIENT)
Dept: OTHER | Age: 54
End: 2019-04-10

## 2019-04-10 LAB
CHOLESTEROL/HDL RATIO: 3.7
CHOLESTEROL: 242 MG/DL
GLUCOSE BLD-MCNC: 97 MG/DL (ref 70–99)
HDLC SERPL-MCNC: 65 MG/DL
LDL CHOLESTEROL: 161 MG/DL (ref 0–130)
PATIENT FASTING?: YES
TRIGL SERPL-MCNC: 81 MG/DL
VLDLC SERPL CALC-MCNC: ABNORMAL MG/DL (ref 1–30)

## 2019-04-16 ENCOUNTER — NURSE TRIAGE (OUTPATIENT)
Dept: OTHER | Facility: CLINIC | Age: 54
End: 2019-04-16

## 2019-04-16 ENCOUNTER — HOSPITAL ENCOUNTER (EMERGENCY)
Age: 54
Discharge: HOME | End: 2019-04-16
Attending: INTERNAL MEDICINE
Payer: COMMERCIAL

## 2019-04-16 ENCOUNTER — APPOINTMENT (OUTPATIENT)
Dept: CT IMAGING | Age: 54
End: 2019-04-16
Payer: COMMERCIAL

## 2019-04-16 VITALS
WEIGHT: 137 LBS | SYSTOLIC BLOOD PRESSURE: 139 MMHG | OXYGEN SATURATION: 97 % | BODY MASS INDEX: 27.67 KG/M2 | TEMPERATURE: 98.4 F | HEART RATE: 106 BPM | RESPIRATION RATE: 18 BRPM | DIASTOLIC BLOOD PRESSURE: 78 MMHG

## 2019-04-16 DIAGNOSIS — R19.7 NAUSEA VOMITING AND DIARRHEA: ICD-10-CM

## 2019-04-16 DIAGNOSIS — E86.0 DEHYDRATION: ICD-10-CM

## 2019-04-16 DIAGNOSIS — R11.2 NAUSEA VOMITING AND DIARRHEA: ICD-10-CM

## 2019-04-16 DIAGNOSIS — K52.9 GASTROENTERITIS: Primary | ICD-10-CM

## 2019-04-16 LAB
-: ABNORMAL
ABSOLUTE EOS #: 0.09 K/UL (ref 0–0.4)
ABSOLUTE IMMATURE GRANULOCYTE: ABNORMAL K/UL (ref 0–0.3)
ABSOLUTE LYMPH #: 0.44 K/UL (ref 1–4.8)
ABSOLUTE MONO #: 0.35 K/UL (ref 0–1)
ALBUMIN SERPL-MCNC: 4.9 G/DL (ref 3.5–5.2)
ALBUMIN/GLOBULIN RATIO: ABNORMAL (ref 1–2.5)
ALP BLD-CCNC: 100 U/L (ref 35–104)
ALT SERPL-CCNC: 28 U/L (ref 5–33)
AMORPHOUS: ABNORMAL
ANION GAP SERPL CALCULATED.3IONS-SCNC: 14 MMOL/L (ref 9–17)
AST SERPL-CCNC: 22 U/L
BACTERIA: ABNORMAL
BASOPHILS # BLD: ABNORMAL % (ref 0–2)
BASOPHILS ABSOLUTE: ABNORMAL K/UL (ref 0–0.2)
BILIRUB SERPL-MCNC: 0.68 MG/DL (ref 0.3–1.2)
BILIRUBIN URINE: NEGATIVE
BUN BLDV-MCNC: 18 MG/DL (ref 6–20)
BUN/CREAT BLD: 26 (ref 9–20)
CALCIUM SERPL-MCNC: 9.1 MG/DL (ref 8.6–10.4)
CASTS UA: ABNORMAL /LPF
CHLORIDE BLD-SCNC: 105 MMOL/L (ref 98–107)
CO2: 18 MMOL/L (ref 20–31)
COLOR: YELLOW
COMMENT UA: ABNORMAL
CREAT SERPL-MCNC: 0.7 MG/DL (ref 0.5–0.9)
CRYSTALS, UA: ABNORMAL /HPF
DIFFERENTIAL TYPE: ABNORMAL
EOSINOPHILS RELATIVE PERCENT: 1 % (ref 0–5)
EPITHELIAL CELLS UA: ABNORMAL /HPF
GFR AFRICAN AMERICAN: >60 ML/MIN
GFR NON-AFRICAN AMERICAN: >60 ML/MIN
GFR SERPL CREATININE-BSD FRML MDRD: ABNORMAL ML/MIN/{1.73_M2}
GFR SERPL CREATININE-BSD FRML MDRD: ABNORMAL ML/MIN/{1.73_M2}
GLUCOSE BLD-MCNC: 120 MG/DL (ref 70–99)
GLUCOSE URINE: NEGATIVE
HCT VFR BLD CALC: 45.6 % (ref 36–46)
HEMOGLOBIN: 15.1 G/DL (ref 12–16)
IMMATURE GRANULOCYTES: ABNORMAL %
KETONES, URINE: ABNORMAL
LACTIC ACID: 1.1 MMOL/L (ref 0.5–2.2)
LEUKOCYTE ESTERASE, URINE: ABNORMAL
LYMPHOCYTES # BLD: 5 % (ref 15–40)
MCH RBC QN AUTO: 29.8 PG (ref 26–34)
MCHC RBC AUTO-ENTMCNC: 33 G/DL (ref 31–37)
MCV RBC AUTO: 90.2 FL (ref 80–100)
MONOCYTES # BLD: 4 % (ref 4–8)
MORPHOLOGY: ABNORMAL
MUCUS: ABNORMAL
NITRITE, URINE: NEGATIVE
NRBC AUTOMATED: ABNORMAL PER 100 WBC
OTHER OBSERVATIONS UA: ABNORMAL
PDW BLD-RTO: 13.9 % (ref 12.1–15.2)
PH UA: 5 (ref 5–8)
PLATELET # BLD: 235 K/UL (ref 140–450)
PLATELET ESTIMATE: ABNORMAL
PMV BLD AUTO: ABNORMAL FL (ref 6–12)
POTASSIUM SERPL-SCNC: 3.9 MMOL/L (ref 3.7–5.3)
PROTEIN UA: NEGATIVE
RBC # BLD: 5.06 M/UL (ref 4–5.2)
RBC # BLD: ABNORMAL 10*6/UL
RBC UA: ABNORMAL /HPF (ref 0–2)
RENAL EPITHELIAL, UA: ABNORMAL /HPF
SEG NEUTROPHILS: 90 % (ref 47–75)
SEGMENTED NEUTROPHILS ABSOLUTE COUNT: 7.92 K/UL (ref 2.5–7)
SODIUM BLD-SCNC: 137 MMOL/L (ref 135–144)
SPECIFIC GRAVITY UA: 1.02 (ref 1–1.03)
TOTAL PROTEIN: 8.3 G/DL (ref 6.4–8.3)
TRICHOMONAS: ABNORMAL
TURBIDITY: CLEAR
URINE HGB: NEGATIVE
UROBILINOGEN, URINE: NORMAL
WBC # BLD: 8.8 K/UL (ref 3.5–11)
WBC # BLD: ABNORMAL 10*3/UL
WBC UA: ABNORMAL /HPF
YEAST: ABNORMAL

## 2019-04-16 PROCEDURE — 99284 EMERGENCY DEPT VISIT MOD MDM: CPT

## 2019-04-16 PROCEDURE — 96372 THER/PROPH/DIAG INJ SC/IM: CPT

## 2019-04-16 PROCEDURE — 6360000002 HC RX W HCPCS: Performed by: INTERNAL MEDICINE

## 2019-04-16 PROCEDURE — 80053 COMPREHEN METABOLIC PANEL: CPT

## 2019-04-16 PROCEDURE — 96361 HYDRATE IV INFUSION ADD-ON: CPT

## 2019-04-16 PROCEDURE — 96375 TX/PRO/DX INJ NEW DRUG ADDON: CPT

## 2019-04-16 PROCEDURE — 85025 COMPLETE CBC W/AUTO DIFF WBC: CPT

## 2019-04-16 PROCEDURE — 81001 URINALYSIS AUTO W/SCOPE: CPT

## 2019-04-16 PROCEDURE — 74177 CT ABD & PELVIS W/CONTRAST: CPT

## 2019-04-16 PROCEDURE — 6370000000 HC RX 637 (ALT 250 FOR IP): Performed by: INTERNAL MEDICINE

## 2019-04-16 PROCEDURE — 83605 ASSAY OF LACTIC ACID: CPT

## 2019-04-16 PROCEDURE — 96374 THER/PROPH/DIAG INJ IV PUSH: CPT

## 2019-04-16 PROCEDURE — 2580000003 HC RX 258: Performed by: INTERNAL MEDICINE

## 2019-04-16 PROCEDURE — 6360000004 HC RX CONTRAST MEDICATION: Performed by: INTERNAL MEDICINE

## 2019-04-16 RX ORDER — ONDANSETRON 2 MG/ML
4 INJECTION INTRAMUSCULAR; INTRAVENOUS ONCE
Status: COMPLETED | OUTPATIENT
Start: 2019-04-16 | End: 2019-04-16

## 2019-04-16 RX ORDER — DICYCLOMINE HYDROCHLORIDE 10 MG/ML
20 INJECTION INTRAMUSCULAR ONCE
Status: COMPLETED | OUTPATIENT
Start: 2019-04-16 | End: 2019-04-16

## 2019-04-16 RX ORDER — 0.9 % SODIUM CHLORIDE 0.9 %
1000 INTRAVENOUS SOLUTION INTRAVENOUS ONCE
Status: COMPLETED | OUTPATIENT
Start: 2019-04-16 | End: 2019-04-16

## 2019-04-16 RX ORDER — ACETAMINOPHEN 325 MG/1
650 TABLET ORAL ONCE
Status: COMPLETED | OUTPATIENT
Start: 2019-04-16 | End: 2019-04-16

## 2019-04-16 RX ORDER — PROCHLORPERAZINE EDISYLATE 5 MG/ML
10 INJECTION INTRAMUSCULAR; INTRAVENOUS ONCE
Status: COMPLETED | OUTPATIENT
Start: 2019-04-16 | End: 2019-04-16

## 2019-04-16 RX ORDER — DICYCLOMINE HYDROCHLORIDE 10 MG/1
10 CAPSULE ORAL
Qty: 10 CAPSULE | Refills: 0 | Status: SHIPPED | OUTPATIENT
Start: 2019-04-16 | End: 2019-05-01

## 2019-04-16 RX ORDER — KETOROLAC TROMETHAMINE 30 MG/ML
30 INJECTION, SOLUTION INTRAMUSCULAR; INTRAVENOUS ONCE
Status: COMPLETED | OUTPATIENT
Start: 2019-04-16 | End: 2019-04-16

## 2019-04-16 RX ORDER — ONDANSETRON 4 MG/1
4 TABLET, ORALLY DISINTEGRATING ORAL EVERY 8 HOURS PRN
Qty: 8 TABLET | Refills: 0 | Status: SHIPPED | OUTPATIENT
Start: 2019-04-16 | End: 2020-07-01 | Stop reason: SDUPTHER

## 2019-04-16 RX ADMIN — KETOROLAC TROMETHAMINE 30 MG: 30 INJECTION INTRAMUSCULAR; INTRAVENOUS at 17:18

## 2019-04-16 RX ADMIN — IOPAMIDOL 75 ML: 755 INJECTION, SOLUTION INTRAVENOUS at 17:46

## 2019-04-16 RX ADMIN — ONDANSETRON 4 MG: 2 INJECTION INTRAMUSCULAR; INTRAVENOUS at 15:33

## 2019-04-16 RX ADMIN — DICYCLOMINE HYDROCHLORIDE 20 MG: 20 INJECTION, SOLUTION INTRAMUSCULAR at 19:10

## 2019-04-16 RX ADMIN — SODIUM CHLORIDE 1000 ML: 9 INJECTION, SOLUTION INTRAVENOUS at 15:33

## 2019-04-16 RX ADMIN — SODIUM CHLORIDE 1000 ML: 9 INJECTION, SOLUTION INTRAVENOUS at 19:10

## 2019-04-16 RX ADMIN — ACETAMINOPHEN 650 MG: 325 TABLET, FILM COATED ORAL at 15:45

## 2019-04-16 RX ADMIN — PROCHLORPERAZINE EDISYLATE 10 MG: 5 INJECTION INTRAMUSCULAR; INTRAVENOUS at 17:18

## 2019-04-16 ASSESSMENT — PAIN SCALES - GENERAL
PAINLEVEL_OUTOF10: 4
PAINLEVEL_OUTOF10: 6
PAINLEVEL_OUTOF10: 6

## 2019-04-16 NOTE — TELEPHONE ENCOUNTER
Reason for Disposition   Constant abdominal pain lasting > 2 hours    Answer Assessment - Initial Assessment Questions  1. DIARRHEA SEVERITY: \"How bad is the diarrhea? \" \"How many extra stools have you had in the past 24 hours than normal?\"     Pt states she has had about 20 episodes of diarrhea since this morning at 0430  2. ONSET: \"When did the diarrhea begin? \"       Today 0430  3. BM CONSISTENCY: \"How loose or watery is the diarrhea? \"       Diarrhea , loose  4. VOMITING: \"Are you also vomiting? \" If so, ask: \"How many times in the past 24 hours? \"       Vomited 4 times since this morning  5. ABDOMINAL PAIN: Brock Peabody you having any abdominal pain? \" If yes: \"What does it feel like? \" (e.g., crampy, dull, intermittent, constant)       Stomachs \"hurts\" , body hurts everywhere  6. ABDOMINAL PAIN SEVERITY: If present, ask: \"How bad is the pain? \"  (e.g., Scale 1-10; mild, moderate, or severe)    Rates discomfort a 5 or 6/10  7. ORAL INTAKE: If vomiting, \"Have you been able to drink liquids? \" \"How much fluids have you had in the past 24 hours? \"      *No Answer*  8. HYDRATION: \"Any signs of dehydration? \" (e.g., dry mouth [not just dry lips], too weak to stand, dizziness, new weight loss) \"When did you last urinate? \"      Pt feels weak, so much that it is difficult to stand. . She states she has been \"freezing all day. \"  9. EXPOSURE: \"Have you traveled to a foreign country recently? \" \"Have you been exposed to anyone with diarrhea? \" \"Could you have eaten any food that was spoiled? \"      *No Answer*  10. ANTIBIOTIC USE: \"Are you taking antibiotics now or have you taken antibiotics in the past 2 months? \"        *No Answer*  11. OTHER SYMPTOMS: \"Do you have any other symptoms? \" (e.g., fever, blood in stool)         Pt feels weak, so much that it is difficult to stand. . She states she has been \"freezing all day. \" fatigue and headache present. 12. PREGNANCY: \"Is there any chance you are pregnant? \" \"When was your last menstrual

## 2019-04-16 NOTE — ED PROVIDER NOTES
[facility] ED  eMERGENCY dEPARTMENT eNCOUnter      Pt Name: Radha Graff  MRN: [id number]  [place] 1965  Date of evaluation: 4/16/2019  Provider: Kris Barakat MD    [address] [place] [address]       Chief Complaint   Patient presents with    Nausea     symptoms began at 0500     Emesis    Diarrhea         HISTORY OF PRESENT ILLNESS   (Location/Symptom, Timing/Onset, Context/Setting, Quality, Duration, Modifying Factors, Severity)  Note limiting factors. Radha Graff is a 47 y.o. female who presents to the emergency department for evaluation and management of nausea, vomiting and diarrhea which began this evening. She has tried nothing for his symptoms. She also describes a headache that is generalized but came on gradually. She has not been sleeping any other providers for this. She has a history of chronic constipation and was hospitalized in the past for colitis and Crohn. She is adopted and does not know her family history. HPI    Nursing Notes were reviewed. REVIEW OF SYSTEMS    (2-9 systems for level 4, 10 or more for level 5)       REVIEW OF SYSTEMS    Constitutional: Negative for fatigue and fever. HENT: Negative for dental problem, ear pain and sore throat. Eyes: Negative for pain and redness. Respiratory: Negative for cough, chest tightness and shortness of breath. Cardiovascular: Negative for chest pain, palpitations and leg swelling. Gastrointestinal: Positive for nausea, vomiting, diarrhea. Negative for abdominal distention, abdominal pain   Endocrine: Negative for cold intolerance, heat intolerance, polydipsia, polyphagia and polyuria. Genitourinary: Negative for difficulty urinating, dysuria, hematuria and urgency. Musculoskeletal: Negative for arthralgias, back pain and neck pain. Skin: Negative for color change, pallor, rash and wound. Allergic/Immunologic: Negative for environmental allergiesand food allergies.    Neurological: Negative for dizziness, speech difficulty, weakness, numbness and headaches. Hematological: Negative for adenopathy. Does not bruise/bleed easily. Psychiatric/Behavioral: Negative for agitation, anxiety, depression, behavioral problems, confusion, hallucinations and suicidal ideas. All other systems reviewed and are negative. Except as noted above theremainder of the review of systems was reviewed and negative. PASTMEDICAL HISTORY     Past Medical History:   Diagnosis Date    Anemia as a teen    Cerebral artery occlusion with cerebral infarction ([facility] [address]     \"mini stroke\"    Crohn's     Headache, classical migraine     migraines    Hyperlipidemia     Nausea & vomiting     Problems with head, neck, and trunk          SURGICAL HISTORY       Past Surgical History:   Procedure Laterality Date    BACK SURGERY      cervical spine injections    BLADDER SURGERY  1972    CARPAL TUNNEL RELEASE Right     right dequervains also    CERVICAL SPINE SURGERY  2004    HYSTERECTOMY, TOTAL ABDOMINAL  2001    OVARY REMOVAL  2008    SHOULDER SURGERY  2003         CURRENT MEDICATIONS       Previous Medications    ASPIRIN EC 81 MG EC TABLET    Take 1 tablet by mouth daily    ELETRIPTAN (RELPAX) 40 MG TABLET    Take 40 mg by mouth once as needed may repeat in 2 hours if necessary    ESCITALOPRAM (LEXAPRO) 20 MG TABLET    TAKE ONE TABLET BY MOUTH ONE TIME A DAY    IBUPROFEN (ADVIL;MOTRIN) 800 MG TABLET    Take 1 tablet by mouth every 8 hours as needed for Pain    PANTOPRAZOLE (PROTONIX) 40 MG TABLET    Take 1 tablet by mouth every morning (before breakfast)    TIZANIDINE (ZANAFLEX) 4 MG TABLET    Take 2 tablets by mouth nightly    TOPIRAMATE (TOPAMAX) 50 MG TABLET    Take 1 tablet by mouth 2 times daily       ALLERGIES     Sulfa antibiotics    FAMILY HISTORY     History reviewed. No pertinent family history.        SOCIAL HISTORY       Social History     Socioeconomic History    Marital status: Single     Spouse name: None    Number of children: None    Years of education: None    Highest education level: None   Occupational History    None   Social Needs    Financial resource strain: None    Food insecurity:     Worry: None     Inability: None    Transportation needs:     Medical: None     Non-medical: None   Tobacco Use    Smoking status: Never Smoker    Smokeless tobacco: Never Used   Substance and Sexual Activity    Alcohol use: No    Drug use: No    Sexual activity: None   Lifestyle    Physical activity:     Days per week: None     Minutes per session: None    Stress: None   Relationships    Social connections:     Talks on phone: None     Gets together: None     Attends Voodoo service: None     Active member of club or organization: None     Attends meetings of clubs or organizations: None     Relationship status: None    Intimate partner violence:     Fear of current or ex partner: None     Emotionally abused: None     Physically abused: None     Forced sexual activity: None   Other Topics Concern    None   Social History Narrative    None       SCREENINGS             PHYSICAL EXAM    (up to 7 for level 4, 8 or more for level 5)     ED Triage Vitals [04/16/19 1516]   BP Temp Temp Source Pulse Resp SpO2 Height Weight   (!) 153/94 98.3 °F (36.8 °C) Oral 111 20 96 % -- 137 lb (62.1 kg)       Physical Exam  Physical Exam   Constitutional:  Appears well, well-developed and well-nourished. No distress noted. Non toxic in appearance. HENT:     Head: Normocephalic and atraumatic. Right Ear: External ear normal. TM normal pearly light reflex. LeftEar: External ear normal. TM normal pearly light reflex. Nose: Nose normal.     Mouth/Throat: Oropharynx is clear and mucosa moist. No oropharyngeal exudate noted. Posterior pharynx is pink and noninjected. Eyes: Conjunctivae and EOM are normal. Pupils are equal,round, and reactive to light. No scleral icterus. Neck: Normal range of motion. Neck supple.  No tracheal deviation present. Cardiovascular: Normal rate, regular rhythm, normal heartsounds and intact distal pulses. Exam reveals no gallop or friction rub. No murmur heard. Pulmonary/Chest: Effort normal and breath sounds are symmetric and normal. No respiratory distress. There are no wheezes, rales or rhonchi. Abdominal: Soft. Bowel sounds are normal. No distension or no mass exhibitted. There is no tenderness, rebound,rigidity or guarding. Genitourinary:   No CVA tenderness noted on examination. Musculoskeletal: Normal range of motion. No edema, tenderness or deformity. Lymphadenopathy:  No cervical adenopathy. Neurological:   alert and oriented to person, place, and time. Reflexes are normal.  There are no cranial nerve deficits. Normal muscle tone, motor and sensory function exhibitted. Coordination and gait normal.   Skin: Skin is warm and dry. No rash noted. No diaphoresis. No erythema. No pallor. Psychiatric: Pleasant and cooperative. normal mood and affect. Behavior is  normal. Judgment and thought content normal.     DIAGNOSTIC RESULTS     EKG: All EKG's are interpreted by the Emergency Department Physician who either signs or Co-signs this chart in the absence of a cardiologist.      Not indicated. RADIOLOGY:   Non-plain film images such as CT, Ultrasoundand MRI are read by the radiologist.     Interpretation per the Radiologist below, if available at the time of this note:    CT ABDOMEN PELVIS W IV CONTRAST Additional Contrast? None   Preliminary Result   Preliminary report only      IMPRESSION:       Fluid density seen in the right colon with air-fluid levels, likely related to    reported history of diarrheal state. There is no evidence for bowel obstruction    or significant bowel wall thickening. Scattered colonic diverticulosis without CT evidence for diverticulitis.                   ED BEDSIDE ULTRASOUND:   Performed by ED Physician - none    LABS:  Labs Reviewed   URINE RT REFLEX TO CULTURE - Abnormal; Notable for the following components:       Result Value    Ketones, Urine TRACE (*)     Leukocyte Esterase, Urine 2+ (*)     All other components within normal limits   CBC WITH AUTO DIFFERENTIAL - Abnormal; Notable for the following components:    Seg Neutrophils 90 (*)     Lymphocytes 5 (*)     Segs Absolute 7.92 (*)     Absolute Lymph # 0.44 (*)     All other components within normal limits   COMPREHENSIVE METABOLIC PANEL W/ REFLEX TO MG FOR LOW K - Abnormal; Notable for the following components:    Glucose 120 (*)     Bun/Cre Ratio 26 (*)     CO2 18 (*)     All other components within normal limits   MICROSCOPIC URINALYSIS - Abnormal; Notable for the following components:    Bacteria, UA 2+ (*)     All other components within normal limits   LACTIC ACID   BLOOD OCCULT STOOL DIAGNOSTIC       All other labs were within normal range or not returned as of this dictation. EMERGENCY DEPARTMENT COURSE and DIFFERENTIAL DIAGNOSIS/MDM:   Vitals:    Vitals:    04/16/19 1516   BP: (!) 153/94   Pulse: 111   Resp: 20   Temp: 98.3 °F (36.8 °C)   TempSrc: Oral   SpO2: 96%   Weight: 137 lb (62.1 kg)       Noted    MDM    CRITICAL CARE TIME   Total Critical Care time was 0 minutes. EDCOURSE       CONSULTS:  None    PROCEDURES:  Unlessotherwise noted below, none     Procedures      Summation    Patient with gastroenteritis and dehydration but no metabolic derangement. There is diverticulosis without diverticulitis. She is improved after ER management and is otherwise now  well hydrated, nontoxic, hemodynamically stable and satisfactory for discharge for outpatient management. I instructed the patient to followup with the PCP for evaluation of response to management of acute illness. .     I instructed the patient to return to the ER if his condition worsens, if there is any concern for altered mental status, difficulty breathing, dehydration or loss of function.         Patient Course:        ED Medicationsadministered this visit:    Medications   ondansetron (ZOFRAN) injection 4 mg (4 mg Intravenous Given 4/16/19 1533)   0.9 % sodium chloride bolus (0 mLs Intravenous Stopped 4/16/19 1633)   acetaminophen (TYLENOL) tablet 650 mg (650 mg Oral Given 4/16/19 1545)   ketorolac (TORADOL) injection 30 mg (30 mg Intravenous Given 4/16/19 1718)   prochlorperazine (COMPAZINE) injection 10 mg (10 mg Intravenous Given 4/16/19 1718)   iopamidol (ISOVUE-370) 76 % injection 75 mL (75 mLs Intravenous Given 4/16/19 1746)       New Prescriptions from this visit:    New Prescriptions    No medications on file       Follow-up:  No follow-up provider specified. Final Impression: No diagnosis found. (Please note that portions of this note werecompleted with a voice recognition program.  Efforts were made to edit the dictations but occasionally words are mis-transcribed.)    FINAL IMPRESSION    No diagnosis found. DISPOSITION/PLAN   DISPOSITION        PATIENT REFERRED TO:  No follow-up provider specified.     DISCHARGE MEDICATIONS:  New Prescriptions    No medications on file          (Please note that portions of this note were completed with a voice recognition program.  Efforts were made to edit the dictations but occasionally words are mis-transcribed.)    Toan Rios MD (electronically signed)  Attending Emergency Physician            Toan Rios MD  04/18/19 2110       Toan Rios MD  04/18/19 2111

## 2019-04-16 NOTE — LETTER
[facility]  [address] [address]  Phone: [phone]               April 16, 2019    Patient: Luis Fernando Saenz   YOB: 1965   Date of Visit: 4/16/2019       To Whom It May Concern:    Luis Fernando Saenz was seen and treated in our emergency department on 4/16/2019. She may return to work on 4/18/19 if feeling better.  .      Sincerely,       Corinne Sander, RN         Signature:__________________________________

## 2019-04-18 RX ORDER — TIZANIDINE 4 MG/1
TABLET ORAL
Qty: 180 TABLET | Refills: 1 | Status: SHIPPED | OUTPATIENT
Start: 2019-04-18 | End: 2019-11-07 | Stop reason: SDUPTHER

## 2019-04-18 RX ORDER — TOPIRAMATE 50 MG/1
TABLET, FILM COATED ORAL
Qty: 180 TABLET | Refills: 1 | Status: SHIPPED | OUTPATIENT
Start: 2019-04-18 | End: 2019-11-07 | Stop reason: SDUPTHER

## 2019-04-18 RX ORDER — ESCITALOPRAM OXALATE 20 MG/1
TABLET ORAL
Qty: 90 TABLET | Refills: 1 | Status: SHIPPED | OUTPATIENT
Start: 2019-04-18 | End: 2019-11-07 | Stop reason: SDUPTHER

## 2019-04-18 NOTE — TELEPHONE ENCOUNTER
Patient requesting refills of her medication. Please advise. Appt on 5/1/19.     Last OV: 3/26/2018  Last RX:   Next scheduled apt: 5/1/2019

## 2019-04-18 NOTE — TELEPHONE ENCOUNTER
Pt has scheduled apt with Dr Michelle Zamarripa on 5/1/19 for check up. Requested medications pending Juve Young's signature.

## 2019-05-01 ENCOUNTER — OFFICE VISIT (OUTPATIENT)
Dept: FAMILY MEDICINE CLINIC | Age: 54
End: 2019-05-01
Payer: COMMERCIAL

## 2019-05-01 VITALS
HEART RATE: 65 BPM | BODY MASS INDEX: 27.01 KG/M2 | OXYGEN SATURATION: 98 % | SYSTOLIC BLOOD PRESSURE: 118 MMHG | DIASTOLIC BLOOD PRESSURE: 80 MMHG | WEIGHT: 134 LBS | HEIGHT: 59 IN

## 2019-05-01 DIAGNOSIS — Z12.11 COLON CANCER SCREENING: ICD-10-CM

## 2019-05-01 DIAGNOSIS — G43.909 MIGRAINE WITHOUT STATUS MIGRAINOSUS, NOT INTRACTABLE, UNSPECIFIED MIGRAINE TYPE: ICD-10-CM

## 2019-05-01 DIAGNOSIS — F41.9 ANXIETY: ICD-10-CM

## 2019-05-01 DIAGNOSIS — Z00.00 ANNUAL PHYSICAL EXAM: Primary | ICD-10-CM

## 2019-05-01 PROCEDURE — 99396 PREV VISIT EST AGE 40-64: CPT | Performed by: FAMILY MEDICINE

## 2019-05-01 ASSESSMENT — ENCOUNTER SYMPTOMS
EYE PAIN: 0
BLURRED VISION: 0
SORE THROAT: 0
NAUSEA: 1
EYE REDNESS: 0
DIARRHEA: 0
BLOOD IN STOOL: 0
VOMITING: 0
ABDOMINAL PAIN: 0
PHOTOPHOBIA: 1

## 2019-05-01 ASSESSMENT — PATIENT HEALTH QUESTIONNAIRE - PHQ9
SUM OF ALL RESPONSES TO PHQ QUESTIONS 1-9: 0
SUM OF ALL RESPONSES TO PHQ QUESTIONS 1-9: 0
1. LITTLE INTEREST OR PLEASURE IN DOING THINGS: 0
2. FEELING DOWN, DEPRESSED OR HOPELESS: 0
SUM OF ALL RESPONSES TO PHQ9 QUESTIONS 1 & 2: 0

## 2019-05-01 NOTE — PATIENT INSTRUCTIONS
SURVEY:    You may be receiving a survey from [employer] regarding your visit today. Please complete the survey to enable us to provide the highest quality of care to you and your family. If you cannot score us a very good on any question, please call the office to discuss how we could of made your experience a very good one. Thank you.

## 2019-05-01 NOTE — PROGRESS NOTES
HPI Notes    Name: Ryan Jarquin  : 1965        Chief Complaint:     Chief Complaint   Patient presents with    Annual Exam     here for physical        History of Present Illness:     Ryan Jarquin is a 49 y.o.  female who presents with Annual Exam (here for physical )    Annual physical - Pt is doing well. She is here for yearly physical. Over the winter pt did have flu in March. Then 2wks ago pt in the ER for the gastroenteritis which has resolved. Pt is eating normally with no N/V/D. Overall pt is doing well. She had labs for [facility] which show improved cholesterol. Her migraines are controlled. Migraine    This is a chronic problem. The current episode started more than 1 year ago. Episode frequency: pt gets 1-2 bad ones yearly and then some small ones MAYBE once a month or less. The problem has been gradually improving. Pain location: either Rt or Lt side. Associated symptoms include nausea and photophobia. Pertinent negatives include no abdominal pain, blurred vision, dizziness, eye pain, eye redness, fever, insomnia, sore throat or vomiting. Treatments tried: Topamax and uses the relpax as needed. The treatment provided significant relief. Her past medical history is significant for migraine headaches. Anxiety - chronic but stable on lexapro. Pt doing well and would like continue on the lexapro.    Past Medical History:     Past Medical History:   Diagnosis Date    Anemia as a teen    Cerebral artery occlusion with cerebral infarction ([facility] [address]     \"mini stroke\"    Crohn's     Headache, classical migraine     migraines    Hyperlipidemia     Nausea & vomiting     Problems with head, neck, and trunk       Reviewed all health maintenance requirements and ordered appropriate tests  Health Maintenance Due   Topic Date Due    HIV screen  1981    Diabetes screen  2006    Shingles Vaccine (1 of 2) 2016    Colon cancer screen colonoscopy  2016    DTaP/Tdap/Td vaccine (2 - Td) 02/20/2018       Past Surgical History:     Past Surgical History:   Procedure Laterality Date    BACK SURGERY      cervical spine injections    BLADDER SURGERY  1972    CARPAL TUNNEL RELEASE Right     right dequervains also    CERVICAL SPINE SURGERY  2004    HYSTERECTOMY, TOTAL ABDOMINAL  2001    OVARY REMOVAL  2008    SHOULDER SURGERY  2003        Medications:       Prior to Admission medications    Medication Sig Start Date End Date Taking? Authorizing Provider   escitalopram (LEXAPRO) 20 MG tablet TAKE 1 TABLET BY MOUTH ONE TIME A DAY 4/18/19  Yes Etelvina Rogers MD   tiZANidine (ZANAFLEX) 4 MG tablet TAKE TWO TABLETS BY MOUTH EVERY EVENING 4/18/19  Yes Etelvina Rogers MD   topiramate (TOPAMAX) 50 MG tablet TAKE ONE TABLET BY MOUTH TWO TIMES A DAY 4/18/19  Yes Etelvina Rogers MD   aspirin EC 81 MG EC tablet Take 1 tablet by mouth daily 8/1/16  Yes Dia Tellez DO   ondansetron (ZOFRAN ODT) 4 MG disintegrating tablet Take 1 tablet by mouth every 8 hours as needed for Nausea or Vomiting 4/16/19   Liston Sacks, MD   pantoprazole (PROTONIX) 40 MG tablet Take 1 tablet by mouth every morning (before breakfast) 3/26/18   Etelvina Rogers MD   eletriptan (RELPAX) 40 MG tablet Take 40 mg by mouth once as needed may repeat in 2 hours if necessary    Historical Provider, MD   ibuprofen (ADVIL;MOTRIN) 800 MG tablet Take 1 tablet by mouth every 8 hours as needed for Pain 5/11/17   Tal Parish DO        Allergies:       Sulfa antibiotics    Social History:     Tobacco:    reports that she has never smoked. She has never used smokeless tobacco.  Alcohol:      reports that she does not drink alcohol. Drug Use:  reports that she does not use drugs. Family History:     History reviewed. No pertinent family history. Review of Systems:       Review of Systems   Constitutional: Negative for chills and fever. HENT: Negative for congestion and sore throat.     Eyes: Positive for photophobia. Negative for blurred vision, pain and redness. Cardiovascular: Negative for chest pain, palpitations and leg swelling. Gastrointestinal: Positive for nausea. Negative for abdominal pain, blood in stool, diarrhea and vomiting. Nausea with headaches. Genitourinary: Negative for difficulty urinating and hematuria. Musculoskeletal: Negative for joint swelling. Skin: Negative for pallor and rash. Neurological: Positive for headaches. Negative for dizziness and facial asymmetry. Psychiatric/Behavioral: Negative for confusion and sleep disturbance. The patient does not have insomnia. Physical Exam:     Physical Exam   Constitutional: She appears well-developed and well-nourished. HENT:   Head: Normocephalic and atraumatic. Eyes: Pupils are equal, round, and reactive to light. Conjunctivae are normal. Right eye exhibits no discharge. Left eye exhibits no discharge. Neck: Neck supple. No thyromegaly present. Cardiovascular: Normal rate, regular rhythm and normal heart sounds. No murmur heard. Pulmonary/Chest: Effort normal and breath sounds normal. No respiratory distress. Abdominal: Soft. Bowel sounds are normal. There is no tenderness. Musculoskeletal: She exhibits no edema. Neurological: She is alert. No cranial nerve deficit. Skin: No rash noted. No erythema. Psychiatric: She has a normal mood and affect. Her behavior is normal.   Vitals reviewed.       Vitals:  /80 (Site: Left Upper Arm, Position: Sitting, Cuff Size: Medium Adult)   Pulse 65   Ht 4' 11\" (1.499 m)   Wt 134 lb (60.8 kg)   SpO2 98%   BMI 27.06 kg/m²       Data:     Lab Results   Component Value Date     04/16/2019    K 3.9 04/16/2019     04/16/2019    CO2 18 04/16/2019    BUN 18 04/16/2019    CREATININE 0.70 04/16/2019    GLUCOSE 120 04/16/2019    PROT 8.3 04/16/2019    LABALBU 4.9 04/16/2019    BILITOT 0.68 04/16/2019    ALKPHOS 100 04/16/2019    AST 22 04/16/2019    ALT 28 04/16/2019     Lab Results   Component Value Date    WBC 8.8 04/16/2019    RBC 5.06 04/16/2019    HGB 15.1 04/16/2019    HCT 45.6 04/16/2019    MCV 90.2 04/16/2019    MCH 29.8 04/16/2019    MCHC 33.0 04/16/2019    RDW 13.9 04/16/2019     04/16/2019    MPV NOT REPORTED 04/16/2019     Lab Results   Component Value Date    TSH 4.16 07/22/2015     Lab Results   Component Value Date    CHOL 242 04/10/2019    HDL 65 04/10/2019          Assessment/Plan:        1. Annual physical exam  Doing well an decreased LDL cholesterol so continue to exercise and monitor low fat diet and thierry labs in one year    2. Migraine without status migrainosus, not intractable, unspecified migraine type  Pt will stay on topamax daily and zanaflex at night to help keep neck muscles relaxed. Pt uses relpax as needed. 3. Anxiety   Stable on lexapro    4. Colon cancer screening  cologuard sent home  - COLOGUARD (For external results only); Future    Pt will wait until next year for mammogram. Labs yearly at [facility]. Pt told to call when wants order for shingles vaccines at the [facility] and go to occupational health for tetanus shot      Return in about 1 year (around 5/1/2020).       Electronically signed by Dereje Parr MD on 5/1/2019 at 7:21 PM

## 2019-05-06 VITALS — WEIGHT: 141 LBS | BODY MASS INDEX: 28.48 KG/M2

## 2019-11-07 RX ORDER — TIZANIDINE 4 MG/1
TABLET ORAL
Qty: 180 TABLET | Refills: 1 | Status: SHIPPED | OUTPATIENT
Start: 2019-11-07 | End: 2020-07-01 | Stop reason: SDUPTHER

## 2019-11-07 RX ORDER — ESCITALOPRAM OXALATE 20 MG/1
TABLET ORAL
Qty: 90 TABLET | Refills: 1 | Status: SHIPPED | OUTPATIENT
Start: 2019-11-07 | End: 2020-06-18

## 2019-11-07 RX ORDER — TOPIRAMATE 50 MG/1
TABLET, FILM COATED ORAL
Qty: 180 TABLET | Refills: 1 | Status: SHIPPED | OUTPATIENT
Start: 2019-11-07 | End: 2020-07-01 | Stop reason: SDUPTHER

## 2019-12-06 ENCOUNTER — OFFICE VISIT (OUTPATIENT)
Dept: PRIMARY CARE CLINIC | Age: 54
End: 2019-12-06
Payer: COMMERCIAL

## 2019-12-06 VITALS
TEMPERATURE: 98.4 F | SYSTOLIC BLOOD PRESSURE: 124 MMHG | DIASTOLIC BLOOD PRESSURE: 86 MMHG | WEIGHT: 141 LBS | BODY MASS INDEX: 28.48 KG/M2 | OXYGEN SATURATION: 97 % | HEART RATE: 68 BPM

## 2019-12-06 DIAGNOSIS — J01.40 ACUTE NON-RECURRENT PANSINUSITIS: Primary | ICD-10-CM

## 2019-12-06 DIAGNOSIS — J02.9 PHARYNGITIS, UNSPECIFIED ETIOLOGY: ICD-10-CM

## 2019-12-06 LAB — S PYO AG THROAT QL: NORMAL

## 2019-12-06 PROCEDURE — 87880 STREP A ASSAY W/OPTIC: CPT | Performed by: NURSE PRACTITIONER

## 2019-12-06 PROCEDURE — 99213 OFFICE O/P EST LOW 20 MIN: CPT | Performed by: NURSE PRACTITIONER

## 2019-12-06 RX ORDER — AMOXICILLIN AND CLAVULANATE POTASSIUM 875; 125 MG/1; MG/1
1 TABLET, FILM COATED ORAL 2 TIMES DAILY
Qty: 20 TABLET | Refills: 0 | Status: SHIPPED | OUTPATIENT
Start: 2019-12-06 | End: 2019-12-16

## 2019-12-06 ASSESSMENT — ENCOUNTER SYMPTOMS
SINUS PAIN: 1
RHINORRHEA: 1
EYES NEGATIVE: 1
COUGH: 1
SINUS PRESSURE: 1
GASTROINTESTINAL NEGATIVE: 1
HOARSE VOICE: 1
SORE THROAT: 1

## 2020-03-05 ENCOUNTER — EMPLOYEE WELLNESS (OUTPATIENT)
Dept: OTHER | Age: 55
End: 2020-03-05

## 2020-03-07 LAB
3-OH-COTININE: <2 NG/ML
CHOLESTEROL/HDL RATIO: 3.5
CHOLESTEROL: 229 MG/DL
COTININE: <2 NG/ML
GLUCOSE BLD-MCNC: 105 MG/DL (ref 70–99)
HDLC SERPL-MCNC: 66 MG/DL
LDL CHOLESTEROL: 144 MG/DL (ref 0–130)
NICOTINE: <2 NG/ML
PATIENT FASTING?: YES
TRIGL SERPL-MCNC: 96 MG/DL
VLDLC SERPL CALC-MCNC: ABNORMAL MG/DL (ref 1–30)

## 2020-07-01 ENCOUNTER — OFFICE VISIT (OUTPATIENT)
Dept: FAMILY MEDICINE CLINIC | Age: 55
End: 2020-07-01
Payer: COMMERCIAL

## 2020-07-01 VITALS
HEIGHT: 59 IN | DIASTOLIC BLOOD PRESSURE: 82 MMHG | SYSTOLIC BLOOD PRESSURE: 132 MMHG | WEIGHT: 147 LBS | BODY MASS INDEX: 29.64 KG/M2 | HEART RATE: 64 BPM | OXYGEN SATURATION: 99 % | TEMPERATURE: 97.8 F

## 2020-07-01 PROCEDURE — 99213 OFFICE O/P EST LOW 20 MIN: CPT | Performed by: FAMILY MEDICINE

## 2020-07-01 RX ORDER — TOPIRAMATE 50 MG/1
TABLET, FILM COATED ORAL
Qty: 180 TABLET | Refills: 3 | Status: SHIPPED | OUTPATIENT
Start: 2020-07-01 | End: 2021-07-09 | Stop reason: SDUPTHER

## 2020-07-01 RX ORDER — TIZANIDINE 4 MG/1
TABLET ORAL
Qty: 180 TABLET | Refills: 3 | Status: SHIPPED | OUTPATIENT
Start: 2020-07-01 | End: 2021-07-09 | Stop reason: SDUPTHER

## 2020-07-01 RX ORDER — ONDANSETRON 4 MG/1
4 TABLET, ORALLY DISINTEGRATING ORAL EVERY 8 HOURS PRN
Qty: 30 TABLET | Refills: 1 | Status: SHIPPED | OUTPATIENT
Start: 2020-07-01 | End: 2021-07-09 | Stop reason: SDUPTHER

## 2020-07-01 SDOH — ECONOMIC STABILITY: TRANSPORTATION INSECURITY
IN THE PAST 12 MONTHS, HAS LACK OF TRANSPORTATION KEPT YOU FROM MEETINGS, WORK, OR FROM GETTING THINGS NEEDED FOR DAILY LIVING?: NO

## 2020-07-01 SDOH — ECONOMIC STABILITY: INCOME INSECURITY: HOW HARD IS IT FOR YOU TO PAY FOR THE VERY BASICS LIKE FOOD, HOUSING, MEDICAL CARE, AND HEATING?: NOT VERY HARD

## 2020-07-01 SDOH — ECONOMIC STABILITY: TRANSPORTATION INSECURITY
IN THE PAST 12 MONTHS, HAS THE LACK OF TRANSPORTATION KEPT YOU FROM MEDICAL APPOINTMENTS OR FROM GETTING MEDICATIONS?: NO

## 2020-07-01 SDOH — ECONOMIC STABILITY: FOOD INSECURITY: WITHIN THE PAST 12 MONTHS, THE FOOD YOU BOUGHT JUST DIDN'T LAST AND YOU DIDN'T HAVE MONEY TO GET MORE.: NEVER TRUE

## 2020-07-01 SDOH — ECONOMIC STABILITY: FOOD INSECURITY: WITHIN THE PAST 12 MONTHS, YOU WORRIED THAT YOUR FOOD WOULD RUN OUT BEFORE YOU GOT MONEY TO BUY MORE.: NEVER TRUE

## 2020-07-01 ASSESSMENT — ENCOUNTER SYMPTOMS
ABDOMINAL PAIN: 0
VOMITING: 0
CONSTIPATION: 0
NAUSEA: 1
EYE REDNESS: 0
SHORTNESS OF BREATH: 0
VISUAL CHANGE: 0
FACIAL SWELLING: 0
BLOOD IN STOOL: 0
COUGH: 0
EYE DISCHARGE: 0
TROUBLE SWALLOWING: 0
DIARRHEA: 0

## 2020-07-01 ASSESSMENT — PATIENT HEALTH QUESTIONNAIRE - PHQ9
2. FEELING DOWN, DEPRESSED OR HOPELESS: 1
SUM OF ALL RESPONSES TO PHQ QUESTIONS 1-9: 2
1. LITTLE INTEREST OR PLEASURE IN DOING THINGS: 1
SUM OF ALL RESPONSES TO PHQ QUESTIONS 1-9: 2
SUM OF ALL RESPONSES TO PHQ9 QUESTIONS 1 & 2: 2

## 2020-07-01 NOTE — PROGRESS NOTES
HPI Notes    Name: Anders Cyr  : 1965        Chief Complaint:     Chief Complaint   Patient presents with    Mental Health Problem    Migraine       History of Present Illness:     Anders Cyr is a 48 y.o.  female who presents with Mental Health Problem and Migraine      Mental Health Problem   The primary symptoms do not include dysphoric mood, delusions or hallucinations. Primary symptoms comment: Overall doing ok and taking the Lexapro. Pt did have hard time working from home during [employer] so glad to be back at work. . The current episode started more than 1 month ago. This is a chronic problem. The degree of incapacity that she is experiencing as a consequence of her illness is mild. Additional symptoms of the illness include headaches. Additional symptoms of the illness do not include insomnia, hypersomnia, unexpected weight change, fatigue, visual change or abdominal pain. She does not admit to suicidal ideas. She does not have a plan to commit suicide. She does not contemplate harming herself. She has not already injured self. She does not contemplate injuring another person. She has not already  injured another person. Migraine    This is a chronic problem. The current episode started more than 1 year ago. The problem occurs monthly. The problem has been unchanged. The pain is located in the left unilateral region. The pain does not radiate. The quality of the pain is described as aching. Associated symptoms include nausea. Pertinent negatives include no abdominal pain, coughing, dizziness, eye redness, fever, insomnia, visual change or vomiting. She has tried triptans (topamax) for the symptoms. The treatment provided significant relief.        Past Medical History:     Past Medical History:   Diagnosis Date    Anemia as a teen    Cerebral artery occlusion with cerebral infarction ([facility] [address]     \"mini stroke\"    Crohn's     Headache, classical migraine     migraines    Hyperlipidemia  Nausea & vomiting     Problems with head, neck, and trunk       Reviewed all health maintenance requirements and ordered appropriate tests  Health Maintenance Due   Topic Date Due    HIV screen  05/01/1981    Diabetes screen  05/01/2006    Shingles Vaccine (1 of 2) 05/01/2016    Colon cancer screen colonoscopy  05/01/2016    DTaP/Tdap/Td vaccine (2 - Td) 02/20/2018    Breast cancer screen  04/09/2020       Past Surgical History:     Past Surgical History:   Procedure Laterality Date    BACK SURGERY      cervical spine injections    BLADDER SURGERY  1972    CARPAL TUNNEL RELEASE Right     right dequervains also    CERVICAL SPINE SURGERY  2004    HYSTERECTOMY, TOTAL ABDOMINAL  2001    OVARY REMOVAL  2008    SHOULDER SURGERY  2003        Medications:       Prior to Admission medications    Medication Sig Start Date End Date Taking? Authorizing Provider   escitalopram (LEXAPRO) 20 MG tablet TAKE 1 TABLET BY MOUTH ONE TIME A DAY 6/18/20  Yes Kaveh Hatch MD   tiZANidine (ZANAFLEX) 4 MG tablet TAKE TWO TABLETS BY MOUTH EVERY EVENING 11/7/19  Yes Kaveh Hatch MD   topiramate (TOPAMAX) 50 MG tablet TAKE ONE TABLET BY MOUTH TWO TIMES A DAY 11/7/19  Yes Kaveh Hatch MD   eletriptan (RELPAX) 40 MG tablet Take 40 mg by mouth once as needed may repeat in 2 hours if necessary   Yes Historical Provider, MD   aspirin EC 81 MG EC tablet Take 1 tablet by mouth daily 8/1/16  Yes Hazel Thomas DO   ondansetron (ZOFRAN ODT) 4 MG disintegrating tablet Take 1 tablet by mouth every 8 hours as needed for Nausea or Vomiting 4/16/19   Martín Moreland MD   ibuprofen (ADVIL;MOTRIN) 800 MG tablet Take 1 tablet by mouth every 8 hours as needed for Pain 5/11/17   Mani Richter DO        Allergies:       Sulfa antibiotics    Social History:     Tobacco:    reports that she has never smoked. She has never used smokeless tobacco.  Alcohol:      reports no history of alcohol use.   Drug Use:  reports no history of drug use. Family History:     History reviewed. No pertinent family history. Review of Systems:       Review of Systems   Constitutional: Negative for chills, fatigue, fever and unexpected weight change. HENT: Negative for facial swelling and trouble swallowing. Eyes: Negative for discharge and redness. Respiratory: Negative for cough and shortness of breath. Cardiovascular: Negative for chest pain and palpitations. Gastrointestinal: Positive for nausea. Negative for abdominal pain, blood in stool, constipation, diarrhea and vomiting. Genitourinary: Negative for dysuria and hematuria. Musculoskeletal: Negative for joint swelling and neck stiffness. Skin: Negative for pallor and rash. Neurological: Positive for headaches. Negative for dizziness and light-headedness. Psychiatric/Behavioral: Negative for confusion, dysphoric mood, hallucinations and sleep disturbance. The patient does not have insomnia. Physical Exam:     Physical Exam  Vitals signs reviewed. Constitutional:       General: She is not in acute distress. Appearance: She is well-developed. She is not ill-appearing. HENT:      Head: Normocephalic and atraumatic. Eyes:      General:         Right eye: No discharge. Left eye: No discharge. Conjunctiva/sclera: Conjunctivae normal.      Pupils: Pupils are equal, round, and reactive to light. Neck:      Musculoskeletal: Neck supple. Thyroid: No thyromegaly. Cardiovascular:      Rate and Rhythm: Normal rate and regular rhythm. Heart sounds: No murmur. Pulmonary:      Effort: Pulmonary effort is normal.      Breath sounds: Normal breath sounds. Abdominal:      General: Bowel sounds are normal.      Palpations: Abdomen is soft. Tenderness: There is no abdominal tenderness. Skin:     General: Skin is warm and dry. Findings: No rash. Neurological:      Mental Status: She is alert.    Psychiatric:         Mood and Affect: Mood normal.         Behavior: Behavior normal.         Vitals:  /82   Pulse 64   Temp 97.8 °F (36.6 °C)   Ht 4' 11\" (1.499 m)   Wt 147 lb (66.7 kg)   SpO2 99%   BMI 29.69 kg/m²       Data:     Lab Results   Component Value Date     04/16/2019    K 3.9 04/16/2019     04/16/2019    CO2 18 04/16/2019    BUN 18 04/16/2019    CREATININE 0.70 04/16/2019    GLUCOSE 105 03/05/2020    PROT 8.3 04/16/2019    LABALBU 4.9 04/16/2019    BILITOT 0.68 04/16/2019    ALKPHOS 100 04/16/2019    AST 22 04/16/2019    ALT 28 04/16/2019     Lab Results   Component Value Date    WBC 8.8 04/16/2019    RBC 5.06 04/16/2019    HGB 15.1 04/16/2019    HCT 45.6 04/16/2019    MCV 90.2 04/16/2019    MCH 29.8 04/16/2019    MCHC 33.0 04/16/2019    RDW 13.9 04/16/2019     04/16/2019    MPV NOT REPORTED 04/16/2019     Lab Results   Component Value Date    TSH 4.16 07/22/2015     Lab Results   Component Value Date    CHOL 229 03/05/2020    HDL 66 03/05/2020          Assessment/Plan:        1. Intractable chronic migraine without aura and without status migrainosus  Stable on topamax and has relpax as needed. Pt also taking zanaflex for neck pain    2. Anxiety and depression  Stable now on the lexapro. Pt will do mammogram and cologuard. Pt does labs with [facility] at work yearly    Return in about 1 year (around 7/1/2021).       Electronically signed by Kirsty Reed MD on 7/1/2020 at 8:27 AM

## 2020-07-15 ENCOUNTER — TELEPHONE (OUTPATIENT)
Dept: FAMILY MEDICINE CLINIC | Age: 55
End: 2020-07-15

## 2020-09-25 RX ORDER — ESCITALOPRAM OXALATE 20 MG/1
TABLET ORAL
Qty: 90 TABLET | Refills: 2 | Status: SHIPPED | OUTPATIENT
Start: 2020-09-25 | End: 2021-07-13 | Stop reason: SDUPTHER

## 2020-10-08 ENCOUNTER — HOSPITAL ENCOUNTER (OUTPATIENT)
Dept: MAMMOGRAPHY | Age: 55
Discharge: HOME | End: 2020-10-10
Payer: COMMERCIAL

## 2020-10-08 PROCEDURE — 77063 BREAST TOMOSYNTHESIS BI: CPT

## 2020-10-19 VITALS — BODY MASS INDEX: 28.88 KG/M2 | WEIGHT: 143 LBS

## 2020-12-07 ENCOUNTER — TELEPHONE (OUTPATIENT)
Dept: FAMILY MEDICINE CLINIC | Age: 55
End: 2020-12-07

## 2020-12-08 ENCOUNTER — TELEMEDICINE (OUTPATIENT)
Dept: FAMILY MEDICINE CLINIC | Age: 55
End: 2020-12-08
Payer: COMMERCIAL

## 2020-12-08 PROCEDURE — 99213 OFFICE O/P EST LOW 20 MIN: CPT | Performed by: FAMILY MEDICINE

## 2020-12-08 RX ORDER — DOXYCYCLINE HYCLATE 100 MG
100 TABLET ORAL 2 TIMES DAILY
Qty: 20 TABLET | Refills: 0 | Status: SHIPPED | OUTPATIENT
Start: 2020-12-08 | End: 2020-12-18

## 2020-12-08 ASSESSMENT — ENCOUNTER SYMPTOMS
VOMITING: 0
SINUS PRESSURE: 1
SHORTNESS OF BREATH: 0
SORE THROAT: 0
COUGH: 0
DIARRHEA: 0

## 2020-12-08 NOTE — PROGRESS NOTES
tablet Take 1 tablet by mouth 2 times daily for 10 days 12/8/20 12/18/20 Yes Flavio Brown MD   aspirin EC 81 MG EC tablet Take 1 tablet by mouth daily 8/1/16  Yes Janette Holloway DO   escitalopram (LEXAPRO) 20 MG tablet TAKE 1 TABLET BY MOUTH ONE TIME A DAY 9/25/20   Flavio Brown MD   tiZANidine (ZANAFLEX) 4 MG tablet TAKE TWO TABLETS BY MOUTH EVERY EVENING 7/1/20   Flavio Brown MD   topiramate (TOPAMAX) 50 MG tablet TAKE ONE TABLET BY MOUTH TWO TIMES A DAY 7/1/20   Flavio Brown MD   ondansetron (ZOFRAN ODT) 4 MG disintegrating tablet Take 1 tablet by mouth every 8 hours as needed for Nausea or Vomiting 7/1/20   Flavio Brown MD   eletriptan (RELPAX) 40 MG tablet Take 40 mg by mouth once as needed may repeat in 2 hours if necessary    Historical Provider, MD   ibuprofen (ADVIL;MOTRIN) 800 MG tablet Take 1 tablet by mouth every 8 hours as needed for Pain 5/11/17   Mary Bear DO        Allergies:       Sulfa antibiotics    Social History:     Tobacco:    reports that she has never smoked. She has never used smokeless tobacco.  Alcohol:      reports no history of alcohol use. Drug Use:  reports no history of drug use. Family History:     No family history on file. Review of Systems:       Review of Systems   Constitutional: Negative for chills, fatigue and fever. HENT: Positive for congestion and sinus pressure. Negative for ear pain and sore throat. Respiratory: Negative for cough and shortness of breath. Gastrointestinal: Negative for diarrhea and vomiting. Skin: Negative for rash. Neurological: Positive for headaches. Vertigo. Physical Exam:     Physical Exam  Constitutional:       General: She is not in acute distress. Appearance: Normal appearance. She is not ill-appearing. HENT:      Head: Normocephalic and atraumatic. Nose: Congestion present.       Comments: Pt points to nasal bridge for sinus pressure  Eyes:      General:         Right eye: No discharge. Left eye: No discharge. Conjunctiva/sclera: Conjunctivae normal.   Pulmonary:      Effort: Pulmonary effort is normal. No respiratory distress. Neurological:      Mental Status: She is alert. Vitals: There were no vitals taken for this visit. Data:     Lab Results   Component Value Date     04/16/2019    K 3.9 04/16/2019     04/16/2019    CO2 18 04/16/2019    BUN 18 04/16/2019    CREATININE 0.70 04/16/2019    GLUCOSE 105 03/05/2020    PROT 8.3 04/16/2019    LABALBU 4.9 04/16/2019    BILITOT 0.68 04/16/2019    ALKPHOS 100 04/16/2019    AST 22 04/16/2019    ALT 28 04/16/2019     Lab Results   Component Value Date    WBC 8.8 04/16/2019    RBC 5.06 04/16/2019    HGB 15.1 04/16/2019    HCT 45.6 04/16/2019    MCV 90.2 04/16/2019    MCH 29.8 04/16/2019    MCHC 33.0 04/16/2019    RDW 13.9 04/16/2019     04/16/2019    MPV NOT REPORTED 04/16/2019     Lab Results   Component Value Date    TSH 4.16 07/22/2015     Lab Results   Component Value Date    CHOL 229 03/05/2020    HDL 66 03/05/2020          Assessment/Plan:        1. Acute non-recurrent frontal sinusitis  Take all antibiotics, increase rest and fluids. F/U 4-5d if not better or sooner if worse. All questions answered. Pt is ok to go back to work tomorrow. Kayce Harden is a 48 y.o. female being evaluated by a Virtual Visit (video visit) encounter to address concerns as mentioned above. A caregiver was present when appropriate. Due to this being a TeleHealth encounter (During [place] [id number] public health emergency), evaluation of the following organ systems was limited: Vitals/Constitutional/EENT/Resp/CV/GI//MS/Neuro/Skin/Heme-Lymph-Imm.   Pursuant to the emergency declaration under the [facility], [address] [place] [address] authority and the [employer] Act, this Virtual Visit was conducted with patient's (and/or legal guardian's) consent, to reduce the patient's risk of exposure to COVID-19 and provide necessary medical care. The patient (and/or legal guardian) has also been advised to contact this office for worsening conditions or problems, and seek emergency medical treatment and/or call 911 if deemed necessary. Patient identification was verified at the start of the visit: Yes    Total time spent for this encounter: 12min    Services were provided through a video synchronous discussion virtually to substitute for in-person clinic visit. Patient and provider were located at their individual homes. --Jerri Potts MD on 12/8/2020 at 5:29 PM    An electronic signature was used to authenticate this note. No follow-ups on file.       Electronically signed by Jerri Potts MD on 12/8/2020 at 5:28 PM

## 2020-12-08 NOTE — LETTER
[facility] [address] [place] [id number]  Phone: [phone]  Fax: Bassam Kruse MD        December 8, 2020     Patient: Haile Trinh   YOB: 1965   Date of Visit: 12/8/2020       To Whom It May Concern: It is my medical opinion that Haile Trinh may return to work on 12/9/20. If you have any questions or concerns, please don't hesitate to call.     Sincerely,          Maxwell Gallegos MD

## 2021-07-09 ENCOUNTER — OFFICE VISIT (OUTPATIENT)
Dept: FAMILY MEDICINE CLINIC | Age: 56
End: 2021-07-09
Payer: COMMERCIAL

## 2021-07-09 VITALS
BODY MASS INDEX: 32.05 KG/M2 | HEIGHT: 59 IN | DIASTOLIC BLOOD PRESSURE: 84 MMHG | WEIGHT: 159 LBS | OXYGEN SATURATION: 97 % | TEMPERATURE: 97.2 F | HEART RATE: 64 BPM | SYSTOLIC BLOOD PRESSURE: 136 MMHG

## 2021-07-09 DIAGNOSIS — G43.109 MIGRAINE WITH AURA AND WITHOUT STATUS MIGRAINOSUS, NOT INTRACTABLE: Primary | ICD-10-CM

## 2021-07-09 PROCEDURE — 99213 OFFICE O/P EST LOW 20 MIN: CPT | Performed by: STUDENT IN AN ORGANIZED HEALTH CARE EDUCATION/TRAINING PROGRAM

## 2021-07-09 RX ORDER — ONDANSETRON 4 MG/1
4 TABLET, ORALLY DISINTEGRATING ORAL EVERY 8 HOURS PRN
Qty: 30 TABLET | Refills: 1 | Status: SHIPPED | OUTPATIENT
Start: 2021-07-09

## 2021-07-09 RX ORDER — TOPIRAMATE 50 MG/1
TABLET, FILM COATED ORAL
Qty: 180 TABLET | Refills: 3 | Status: SHIPPED | OUTPATIENT
Start: 2021-07-09

## 2021-07-09 RX ORDER — TIZANIDINE 4 MG/1
TABLET ORAL
Qty: 180 TABLET | Refills: 3 | Status: SHIPPED | OUTPATIENT
Start: 2021-07-09 | End: 2022-10-20 | Stop reason: SDUPTHER

## 2021-07-09 SDOH — ECONOMIC STABILITY: FOOD INSECURITY: WITHIN THE PAST 12 MONTHS, YOU WORRIED THAT YOUR FOOD WOULD RUN OUT BEFORE YOU GOT MONEY TO BUY MORE.: NEVER TRUE

## 2021-07-09 SDOH — ECONOMIC STABILITY: FOOD INSECURITY: WITHIN THE PAST 12 MONTHS, THE FOOD YOU BOUGHT JUST DIDN'T LAST AND YOU DIDN'T HAVE MONEY TO GET MORE.: NEVER TRUE

## 2021-07-09 ASSESSMENT — PATIENT HEALTH QUESTIONNAIRE - PHQ9
SUM OF ALL RESPONSES TO PHQ9 QUESTIONS 1 & 2: 0
SUM OF ALL RESPONSES TO PHQ QUESTIONS 1-9: 0
2. FEELING DOWN, DEPRESSED OR HOPELESS: 0
SUM OF ALL RESPONSES TO PHQ QUESTIONS 1-9: 0
SUM OF ALL RESPONSES TO PHQ QUESTIONS 1-9: 0
1. LITTLE INTEREST OR PLEASURE IN DOING THINGS: 0

## 2021-07-09 ASSESSMENT — ENCOUNTER SYMPTOMS
DIARRHEA: 0
VOMITING: 0
ABDOMINAL PAIN: 0
NAUSEA: 0
SINUS PAIN: 0
BACK PAIN: 0
WHEEZING: 0
COUGH: 0
RHINORRHEA: 0

## 2021-07-09 ASSESSMENT — SOCIAL DETERMINANTS OF HEALTH (SDOH): HOW HARD IS IT FOR YOU TO PAY FOR THE VERY BASICS LIKE FOOD, HOUSING, MEDICAL CARE, AND HEATING?: NOT HARD AT ALL

## 2021-07-09 NOTE — PATIENT INSTRUCTIONS
Claudia,  Let's revamp your migraine regimen. I am NOT concerned you're having a new stroke. STOP Relpax  START Ubrelvy 50mg at onset of migraine  Call me if you have trouble getting this medication  Come back Tuesday for a discussion on your anxiety. Thank you for coming to see me today! It was wonderful to meet you! Please give me a call if you have any other questions or problems, and I will see you at your next visit! Dr. Inga Sharma:    Mecca Joshuane may be receiving a survey from [employer] regarding your visit today. Please complete the survey to enable us to provide the highest quality of care to you and your family. If you cannot score us a very good on any question, please call the office to discuss how we could of made your experience a very good one. Thank you.       Clinical Care Team:     Dr. Tamea Kawasaki, [facility]      ClericalTeam:     Shon Ching     [address]

## 2021-07-13 ENCOUNTER — OFFICE VISIT (OUTPATIENT)
Dept: FAMILY MEDICINE CLINIC | Age: 56
End: 2021-07-13
Payer: COMMERCIAL

## 2021-07-13 VITALS
TEMPERATURE: 97.1 F | BODY MASS INDEX: 31.71 KG/M2 | OXYGEN SATURATION: 98 % | WEIGHT: 157 LBS | DIASTOLIC BLOOD PRESSURE: 74 MMHG | HEART RATE: 64 BPM | SYSTOLIC BLOOD PRESSURE: 110 MMHG

## 2021-07-13 DIAGNOSIS — F32.9 REACTIVE DEPRESSION: ICD-10-CM

## 2021-07-13 DIAGNOSIS — F41.0 PANIC ATTACKS: ICD-10-CM

## 2021-07-13 DIAGNOSIS — F41.9 ANXIETY: Primary | ICD-10-CM

## 2021-07-13 PROCEDURE — 99214 OFFICE O/P EST MOD 30 MIN: CPT | Performed by: STUDENT IN AN ORGANIZED HEALTH CARE EDUCATION/TRAINING PROGRAM

## 2021-07-13 RX ORDER — HYDROXYZINE HYDROCHLORIDE 25 MG/1
25 TABLET, FILM COATED ORAL 4 TIMES DAILY PRN
Qty: 120 TABLET | Refills: 2 | Status: SHIPPED | OUTPATIENT
Start: 2021-07-13 | End: 2021-10-11

## 2021-07-13 RX ORDER — ESCITALOPRAM OXALATE 20 MG/1
TABLET ORAL
Qty: 90 TABLET | Refills: 1 | Status: SHIPPED | OUTPATIENT
Start: 2021-07-13 | End: 2022-02-25 | Stop reason: SDUPTHER

## 2021-07-13 ASSESSMENT — ENCOUNTER SYMPTOMS
ABDOMINAL PAIN: 0
VOMITING: 0
BACK PAIN: 0
COUGH: 0
NAUSEA: 0
RHINORRHEA: 0
WHEEZING: 0
SINUS PAIN: 0
DIARRHEA: 0

## 2021-07-13 NOTE — PATIENT INSTRUCTIONS
Sadie,  I believe your main problem is reactive depression. I'd like you to continue your Lexapro  I'd like to add hydroxyzine. You may use one up to four times a day for panic attacks or combine two in the evening as a sleep aid. I've referred you to counseling. They'll call you. Come back in 4 weeks. Dr. Leopoldo Brandt:    [address] may be receiving a survey from [employer] regarding your visit today. Please complete the survey to enable us to provide the highest quality of care to you and your family. If you cannot score us a very good on any question, please call the office to discuss how we could of made your experience a very good one. Thank you.       Clinical Care Team:     Dr. Adonis Yates, [facility]      ClericalTeam:     Mely Rose     [address]

## 2021-07-13 NOTE — PROGRESS NOTES
HPI Notes    Name: Gloria Sevilla  : 1965         Chief Complaint:     Chief Complaint   Patient presents with    Anxiety     Patient is here for her Anxiety . She is currently taking Lexapro and she feels it isn't working anymore. History of Present Illness:      HPI    This is a 52-year-old woman with medical history significant for anxiety, panic attacks, currently treated with Lexapro 20 mg p.o. daily presenting to discuss the same. She is wondering whether or not her Lexapro is still working. She is having more panic attacks, and states she is not sleeping well at night. \"I feel jumpy all the time\". She states she has a lot of stress at home. Inciting factors include declining health in an aging parent. She describes her relationship with her children and family as \"good\". She does have close friends who are not family members and describes their relationship as \"stressful sometimes\". This is due to her friends being [place] and bothersome\". Her problems sleeping include occasional nights in which she cannot stay asleep consistently. She does not feel rested in the morning. She denies anhedonia, she describes her appetite as unchanged from its usual \"up/down\". She reports \"sometimes I just don't care, and don't eat. She states her mood is overall down.      Past Medical History:     Past Medical History:   Diagnosis Date    Anemia as a teen    Cerebral artery occlusion with cerebral infarction ([facility] [address]     \"mini stroke\"    Crohn's     Headache, classical migraine     migraines    Hyperlipidemia     Nausea & vomiting     Problems with head, neck, and trunk       Reviewed all health maintenance requirements and ordered appropriate tests  Health Maintenance Due   Topic Date Due    Hepatitis C screen  Never done    HIV screen  Never done    Diabetes screen  Never done    Shingles Vaccine (1 of 2) Never done    DTaP/Tdap/Td vaccine (2 - Td or Tdap) 2018       Past Surgical History: Cardiovascular: Negative for chest pain. Gastrointestinal: Negative for abdominal pain, diarrhea, nausea and vomiting. Musculoskeletal: Negative for back pain. Skin: Negative for rash. Neurological: Negative for headaches. Psychiatric/Behavioral: Positive for dysphoric mood and sleep disturbance. The patient is nervous/anxious. Physical Exam:     Vitals:  /74   Pulse 64   Temp 97.1 °F (36.2 °C) (Temporal)   Wt 157 lb (71.2 kg)   SpO2 98%   BMI 31.71 kg/m²       Physical Exam  Vitals and nursing note reviewed. Constitutional:       General: She is not in acute distress. Appearance: Normal appearance. She is obese. Skin:     General: Skin is warm and dry. Capillary Refill: Capillary refill takes less than 2 seconds. Neurological:      General: No focal deficit present. Mental Status: She is alert and oriented to person, place, and time. Mental status is at baseline. Cranial Nerves: No cranial nerve deficit. Psychiatric:         Mood and Affect: Mood and affect normal.         Behavior: Behavior normal.         Thought Content:  Thought content normal.         Judgment: Judgment normal.      Comments: Good insight, future oriented thought and speech                 Data:     Lab Results   Component Value Date     04/16/2019    K 3.9 04/16/2019     04/16/2019    CO2 18 04/16/2019    BUN 18 04/16/2019    CREATININE 0.70 04/16/2019    GLUCOSE 105 03/05/2020    PROT 8.3 04/16/2019    LABALBU 4.9 04/16/2019    BILITOT 0.68 04/16/2019    ALKPHOS 100 04/16/2019    AST 22 04/16/2019    ALT 28 04/16/2019     Lab Results   Component Value Date    WBC 8.8 04/16/2019    RBC 5.06 04/16/2019    HGB 15.1 04/16/2019    HCT 45.6 04/16/2019    MCV 90.2 04/16/2019    MCH 29.8 04/16/2019    MCHC 33.0 04/16/2019    RDW 13.9 04/16/2019     04/16/2019    MPV NOT REPORTED 04/16/2019     Lab Results   Component Value Date    TSH 4.16 07/22/2015     Lab Results Component Value Date    CHOL 229 03/05/2020    HDL 66 03/05/2020          Assessment & Plan        Diagnosis Orders   1. Anxiety  escitalopram (LEXAPRO) 20 MG tablet    hydrOXYzine (ATARAX) 25 MG tablet    External Referral To Counseling Services   2. Panic attacks  escitalopram (LEXAPRO) 20 MG tablet    hydrOXYzine (ATARAX) 25 MG tablet    External Referral To Counseling Services   3. Reactive depression  escitalopram (LEXAPRO) 20 MG tablet    hydrOXYzine (ATARAX) 25 MG tablet    External Referral To Counseling Services       1. While she does have a baseline history of anxiety, panic attacks, I feel that is more accurate to characterize her current symptoms as being from reactive depression, as she has several inciting stressful situations in her life contributing to her depressed mood, problems sleeping. I do not believe she meets DSM-V criteria for major depressive disorder though. I would not recommend a change in her Lexapro, but continue this. I would recommend strongly that she see counseling, for cognitive behavioral therapy or training with coping skills. Adjunct of hydroxyzine would be beneficial, as it can be both an anxiolytic and a nonhabit-forming sleep aid. I would recommend that she use 25 mg 4 times daily as needed for anxiety, and may occasionally use 50 mg p.o. nightly as needed as a sleep aid. The patient I had a long discussion about starting hydroxyzine. We discussed its mechanism of action, intended goals, adverse effects, as well as common side effects. They were able to verbalize understanding, and repeat plan back to me. Return to office in 4 weeks for follow-up on treatment of reactive depression.             Completed Refills   Requested Prescriptions     Signed Prescriptions Disp Refills    escitalopram (LEXAPRO) 20 MG tablet 90 tablet 1     Sig: TAKE 1 TABLET BY MOUTH ONE TIME A DAY    hydrOXYzine (ATARAX) 25 MG tablet 120 tablet 2     Sig: Take 1 tablet by mouth 4 times daily as needed for Itching     Return in about 4 weeks (around 8/10/2021) for Mental Health. Orders Placed This Encounter   Medications    escitalopram (LEXAPRO) 20 MG tablet     Sig: TAKE 1 TABLET BY MOUTH ONE TIME A DAY     Dispense:  90 tablet     Refill:  1    hydrOXYzine (ATARAX) 25 MG tablet     Sig: Take 1 tablet by mouth 4 times daily as needed for Itching     Dispense:  120 tablet     Refill:  2     Orders Placed This Encounter   Procedures    External Referral To Counseling Services     Referral Priority:   Routine     Referral Type:   Eval and Treat     Referral Reason:   Specialty Services Required     Requested Specialty:   Clinical Counselor     Number of Visits Requested:   1         Patient Instructions   Sadie,  I believe your main problem is reactive depression. I'd like you to continue your Lexapro  I'd like to add hydroxyzine. You may use one up to four times a day for panic attacks or combine two in the evening as a sleep aid. I've referred you to counseling. They'll call you. Come back in 4 weeks. Dr. Adolph Oliva:    Renard Iyer may be receiving a survey from [employer] regarding your visit today. Please complete the survey to enable us to provide the highest quality of care to you and your family. If you cannot score us a very good on any question, please call the office to discuss how we could of made your experience a very good one. Thank you.       Clinical Care Team:     Dr. Shyla Coates, [facility]      ClericalTeam:     Mary Felix        Electronically signed by Kalia Daniels DO on 7/13/2021 at 9:27 AM           Completed Refills   Requested Prescriptions     Signed Prescriptions Disp Refills    escitalopram (LEXAPRO) 20 MG tablet 90 tablet 1     Sig: TAKE 1 TABLET BY MOUTH ONE TIME A DAY    hydrOXYzine (ATARAX) 25 MG tablet 120 tablet 2     Sig: Take 1 tablet by mouth 4 times daily as needed for Itching         Sadie received counseling on the following healthy behaviors: medication adherence  Reviewed prior labs and health maintenance. Continue current medications, diet and exercise. Discussed use, benefit, and side effects of prescribed medications. Barriers to medication compliance addressed. Patient given educational materials - see patient instructions. All patient questions answered. Patient voiced understanding.

## 2021-12-09 ENCOUNTER — OFFICE VISIT (OUTPATIENT)
Dept: FAMILY MEDICINE CLINIC | Age: 56
End: 2021-12-09
Payer: COMMERCIAL

## 2021-12-09 VITALS
TEMPERATURE: 97.8 F | SYSTOLIC BLOOD PRESSURE: 126 MMHG | BODY MASS INDEX: 32.72 KG/M2 | DIASTOLIC BLOOD PRESSURE: 80 MMHG | HEART RATE: 84 BPM | OXYGEN SATURATION: 98 % | WEIGHT: 162 LBS

## 2021-12-09 DIAGNOSIS — H65.92 FLUID LEVEL BEHIND TYMPANIC MEMBRANE OF LEFT EAR: Primary | ICD-10-CM

## 2021-12-09 DIAGNOSIS — R09.82 PND (POST-NASAL DRIP): ICD-10-CM

## 2021-12-09 PROCEDURE — 99213 OFFICE O/P EST LOW 20 MIN: CPT | Performed by: NURSE PRACTITIONER

## 2021-12-09 RX ORDER — MECLIZINE HCL 12.5 MG/1
12.5 TABLET ORAL 3 TIMES DAILY PRN
Qty: 90 TABLET | Refills: 0 | Status: SHIPPED | OUTPATIENT
Start: 2021-12-09 | End: 2022-01-08

## 2021-12-09 RX ORDER — UBROGEPANT 100 MG/1
100 TABLET ORAL
COMMUNITY
End: 2022-02-23

## 2021-12-09 ASSESSMENT — ENCOUNTER SYMPTOMS
NAUSEA: 0
DIARRHEA: 0
COUGH: 0
VOMITING: 0
RHINORRHEA: 1
SHORTNESS OF BREATH: 0

## 2021-12-09 NOTE — LETTER
[address]  [address] [facility],[address]  Phone: [phone]  Fax: Greg Galeas [phone], APRN - CNP        December 9, 2021    Anali Benny  [address] [place] [address]      Dear Titi Bun:    Sudafed 12HR 120mg twice daily (nasal decongestant)  Flonase 1 spray each nostril twice daily (nasal steroid)  Zyrtec 10mg Daily OR Claritin 10mg Daily (antihistamine)      If you have any questions or concerns, please don't hesitate to call.     Sincerely,          Loretta Paris, APRN - CNP

## 2021-12-09 NOTE — PROGRESS NOTES
HPI Notes    Name: Abimbola Hopkins  : 1965         Chief Complaint:     Chief Complaint   Patient presents with   Thomas Love     Patient complains of left ear feeling full, feels fluid in ear. She has been having some dizziness. Ear has been bothering her for the last month. History of Present Illness:        Otalgia   There is pain in the left ear. The current episode started 1 to 4 weeks ago. The problem occurs constantly. The problem has been waxing and waning. There has been no fever. Associated symptoms include hearing loss (muffled) and rhinorrhea. Pertinent negatives include no coughing, diarrhea, ear discharge, headaches or vomiting. She has tried nothing for the symptoms. Past Medical History:     Past Medical History:   Diagnosis Date    Anemia as a teen    Cerebral artery occlusion with cerebral infarction ([facility] [address]     \"mini stroke\"    Crohn's     Headache, classical migraine     migraines    Hyperlipidemia     Nausea & vomiting     Problems with head, neck, and trunk       Reviewed all health maintenance requirements and ordered appropriate tests  Health Maintenance Due   Topic Date Due    Hepatitis C screen  Never done    HIV screen  Never done    Diabetes screen  Never done    Shingles Vaccine (1 of 2) Never done    DTaP/Tdap/Td vaccine (2 - Td or Tdap) 2018    Flu vaccine (1) 2021       Past Surgical History:     Past Surgical History:   Procedure Laterality Date    BACK SURGERY      cervical spine injections    BLADDER SURGERY  1972    CARPAL TUNNEL RELEASE Right     right dequervains also    CERVICAL SPINE SURGERY  2004    HYSTERECTOMY, TOTAL ABDOMINAL  2001    OVARY REMOVAL      SHOULDER SURGERY          Medications:       Prior to Admission medications    Medication Sig Start Date End Date Taking?  Authorizing Provider   meclizine (ANTIVERT) 12.5 MG tablet Take 1 tablet by mouth 3 times daily as needed for Dizziness 21 Yes Reyes  KERRIE YoungN - CNP   escitalopram (LEXAPRO) 20 MG tablet TAKE 1 TABLET BY MOUTH ONE TIME A DAY 7/13/21  Yes [place] Vela DO   tiZANidine (ZANAFLEX) 4 MG tablet TAKE TWO TABLETS BY MOUTH EVERY EVENING 7/9/21  Yes [place] Vela DO   topiramate (TOPAMAX) 50 MG tablet TAKE ONE TABLET BY MOUTH TWO TIMES A DAY 7/9/21  Yes Indiana Vela DO   ondansetron (ZOFRAN ODT) 4 MG disintegrating tablet Take 1 tablet by mouth every 8 hours as needed for Nausea or Vomiting 7/9/21  Yes [place] Vela DO   ibuprofen (ADVIL;MOTRIN) 800 MG tablet Take 1 tablet by mouth every 8 hours as needed for Pain 5/11/17  Yes Lopez Santos,    Ubrogepant (UBRELVY) 100 MG TABS Take 100 mg by mouth    Historical Provider, MD        Allergies:       Sulfa antibiotics    Social History:     Tobacco:    reports that she has never smoked. She has never used smokeless tobacco.  Alcohol:      reports no history of alcohol use. Drug Use:  reports no history of drug use. Family History:      No family history on file. Review of Systems:         Review of Systems   Constitutional: Negative for chills and fever. HENT: Positive for ear pain, hearing loss (muffled), postnasal drip and rhinorrhea. Negative for ear discharge. Respiratory: Negative for cough and shortness of breath. Cardiovascular: Negative for chest pain and palpitations. Gastrointestinal: Negative for diarrhea, nausea and vomiting. Neurological: Negative for dizziness, seizures and headaches. Physical Exam:     Vitals:  /80   Pulse 84   Temp 97.8 °F (36.6 °C) (Oral)   Wt 162 lb (73.5 kg)   SpO2 98%   BMI 32.72 kg/m²       Physical Exam  Vitals and nursing note reviewed. Constitutional:       Appearance: Normal appearance. She is well-developed. HENT:      Right Ear: Tympanic membrane normal. Tympanic membrane is not injected or erythematous. Left Ear: A middle ear effusion (cloudy fluid about alf up TM) is present. Tympanic membrane is not injected or erythematous. Nose: Mucosal edema and congestion present. Mouth/Throat:      Pharynx: Posterior oropharyngeal erythema present. Cardiovascular:      Rate and Rhythm: Normal rate and regular rhythm. Heart sounds: Normal heart sounds, S1 normal and S2 normal.   Pulmonary:      Effort: Pulmonary effort is normal. No respiratory distress. Breath sounds: Normal breath sounds. Abdominal:      General: Bowel sounds are normal.      Palpations: Abdomen is soft. Tenderness: There is no abdominal tenderness. Skin:     General: Skin is warm and dry. Neurological:      Mental Status: She is alert and oriented to person, place, and time. Data:     Lab Results   Component Value Date     04/16/2019    K 3.9 04/16/2019     04/16/2019    CO2 18 04/16/2019    BUN 18 04/16/2019    CREATININE 0.70 04/16/2019    GLUCOSE 105 03/05/2020    PROT 8.3 04/16/2019    LABALBU 4.9 04/16/2019    BILITOT 0.68 04/16/2019    ALKPHOS 100 04/16/2019    AST 22 04/16/2019    ALT 28 04/16/2019     Lab Results   Component Value Date    WBC 8.8 04/16/2019    RBC 5.06 04/16/2019    HGB 15.1 04/16/2019    HCT 45.6 04/16/2019    MCV 90.2 04/16/2019    MCH 29.8 04/16/2019    MCHC 33.0 04/16/2019    RDW 13.9 04/16/2019     04/16/2019    MPV NOT REPORTED 04/16/2019     Lab Results   Component Value Date    TSH 4.16 07/22/2015     Lab Results   Component Value Date    CHOL 229 03/05/2020    HDL 66 03/05/2020          Assessment & Plan        Diagnosis Orders   1. Fluid level behind tympanic membrane of left ear     2. PND (post-nasal drip)       Sudafed 12HR 120mg twice daily (nasal decongestant)  Flonase 1 spray each nostril twice daily (nasal steroid)  Zyrtec 10mg Daily OR Claritin 10mg Daily (antihistamine)  Will also give meclizine 12.5mg TID prn for dizziness. Patient verbalizes understanding and agreement with plan. All questions answered.  If symptoms do not resolve or worsen, return to office. Completed Refills   Requested Prescriptions     Signed Prescriptions Disp Refills    meclizine (ANTIVERT) 12.5 MG tablet 90 tablet 0     Sig: Take 1 tablet by mouth 3 times daily as needed for Dizziness     No follow-ups on file. Orders Placed This Encounter   Medications    meclizine (ANTIVERT) 12.5 MG tablet     Sig: Take 1 tablet by mouth 3 times daily as needed for Dizziness     Dispense:  90 tablet     Refill:  0     No orders of the defined types were placed in this encounter. Patient Instructions   SURVEY:    You may be receiving a survey from [employer] regarding your visit today. Please complete the survey to enable us to provide the highest quality of care to you and your family. If you cannot score us a very good (5 Stars) on any question, please call the office to discuss how we could have made your experience a very good one. Thank you. Clinical Care Team: NOAH Zuleta LPN    Clerical Team: [address] [facility]        Electronically signed by BARBARA Zuleta CNP on 12/9/2021 at 3:47 PM           Completed Refills      Requested Prescriptions     Signed Prescriptions Disp Refills    meclizine (ANTIVERT) 12.5 MG tablet 90 tablet 0     Sig: Take 1 tablet by mouth 3 times daily as needed for Dizziness         Sadie received counseling on the following healthy behaviors: medication adherence  Reviewed prior labs and health maintenance. Continue current medications, diet and exercise. Discussed use, benefit, and side effects of prescribed medications. Barriers to medication compliance addressed. Patient given educational materials - see patient instructions. All patient questions answered. Patient voiced understanding.

## 2021-12-09 NOTE — PATIENT INSTRUCTIONS
SURVEY:    You may be receiving a survey from [employer] regarding your visit today. Please complete the survey to enable us to provide the highest quality of care to you and your family. If you cannot score us a very good (5 Stars) on any question, please call the office to discuss how we could have made your experience a very good one. Thank you.     Clinical Care Team: BARBARA Amato-ZOË Hair LPN    Clerical Team: Claudy Sanchez

## 2022-02-23 ENCOUNTER — OFFICE VISIT (OUTPATIENT)
Dept: FAMILY MEDICINE CLINIC | Age: 57
End: 2022-02-23
Payer: COMMERCIAL

## 2022-02-23 VITALS
BODY MASS INDEX: 32.72 KG/M2 | HEART RATE: 76 BPM | DIASTOLIC BLOOD PRESSURE: 80 MMHG | WEIGHT: 162 LBS | OXYGEN SATURATION: 94 % | SYSTOLIC BLOOD PRESSURE: 130 MMHG

## 2022-02-23 DIAGNOSIS — Z13.220 SCREENING CHOLESTEROL LEVEL: ICD-10-CM

## 2022-02-23 DIAGNOSIS — G43.109 MIGRAINE WITH AURA AND WITHOUT STATUS MIGRAINOSUS, NOT INTRACTABLE: Primary | ICD-10-CM

## 2022-02-23 DIAGNOSIS — M54.50 CHRONIC BILATERAL LOW BACK PAIN WITHOUT SCIATICA: ICD-10-CM

## 2022-02-23 DIAGNOSIS — G89.29 CHRONIC BILATERAL LOW BACK PAIN WITHOUT SCIATICA: ICD-10-CM

## 2022-02-23 DIAGNOSIS — H69.81 EUSTACHIAN TUBE DYSFUNCTION, RIGHT: ICD-10-CM

## 2022-02-23 DIAGNOSIS — Z13.1 DIABETES MELLITUS SCREENING: ICD-10-CM

## 2022-02-23 DIAGNOSIS — R53.83 OTHER FATIGUE: ICD-10-CM

## 2022-02-23 DIAGNOSIS — L98.9 SKIN LESION OF CHEEK: ICD-10-CM

## 2022-02-23 PROCEDURE — 99214 OFFICE O/P EST MOD 30 MIN: CPT | Performed by: FAMILY MEDICINE

## 2022-02-23 RX ORDER — RIMEGEPANT SULFATE 75 MG/75MG
75 TABLET, ORALLY DISINTEGRATING ORAL EVERY OTHER DAY
Qty: 8 TABLET | Refills: 0 | COMMUNITY
Start: 2022-02-23 | End: 2022-03-02 | Stop reason: SDUPTHER

## 2022-02-23 ASSESSMENT — ENCOUNTER SYMPTOMS
SORE THROAT: 0
BOWEL INCONTINENCE: 0
RHINORRHEA: 1
VOMITING: 0
COUGH: 0
BACK PAIN: 1
DIARRHEA: 0

## 2022-02-23 NOTE — PROGRESS NOTES
HPI Notes    Name: Fely Newton  : 1965        Chief Complaint:     Chief Complaint   Patient presents with    Back Pain     radiating to pelvic region. hx of endometriosis    Migraine     consistently for 2 weeks    Tinea     red, itchy area on right side of face       History of Present Illness:     Fely Newton is a 55 y.o.  female who presents with Back Pain (radiating to pelvic region. hx of endometriosis), Migraine (consistently for 2 weeks), and Tinea (red, itchy area on right side of face)      Back Pain  This is a chronic problem. The current episode started more than 1 year ago. The problem occurs daily. The problem has been gradually worsening since onset. The pain is present in the lumbar spine. The quality of the pain is described as aching. Radiates to: pain seems to come around from back o \"pelvic area\" at times. Pt concerned as she has h/o enometriosis and believes she has one ovary left (not sure which side). The pain is moderate. The symptoms are aggravated by standing and bending. Associated symptoms include bladder incontinence and headaches. Pertinent negatives include no bowel incontinence, fever, numbness, paresis, paresthesias, perianal numbness, tingling or weakness. She has tried ice, NSAIDs and heat (In past () pt had MRI showing L4-5 and L5 S1 bulginr disc) for the symptoms. Migraine   This is a chronic problem. The current episode started more than 1 year ago. Episode frequency: 3-4 headaches in a month. The problem has been gradually worsening. The pain is located in the bilateral region. The pain does not radiate. The quality of the pain is described as throbbing. The pain is moderate. Associated symptoms include back pain, ear pain and rhinorrhea. Pertinent negatives include no coughing, dizziness, fever, numbness, sore throat, tingling, vomiting or weakness. Associated symptoms comments: Pt gets nausea and blurry vision with the headache as she gets older.  Pt has been to the eye Dr and got new glasses. . Treatments tried: topamax-- pt has been on this forever. The treatment provided moderate relief. Otalgia   There is pain in the right ear. This is a new problem. Episode onset: pt had some sinus drainage back in Dec but went away until few days ago. In past few days the Rt ear hurts too. The problem has been gradually worsening. There has been no fever. Associated symptoms include headaches and rhinorrhea. Pertinent negatives include no coughing, diarrhea, ear discharge, rash, sore throat or vomiting. Associated symptoms comments: Congested and the Rt ear pain. Treatments tried: pt has been taking one zyrtec daily and a sudafed daily. Pt was also taking the flonase then stopped for a week due to a bloody nose and then just restrated it couple days ago. The treatment provided moderate relief. Skin lesion -    Rt side of the cheek pt started with a red circular lesion. Pt states her dad had something similar and it was treated with antifungal. His got better on the anti fungal cream. Pt did kiss her dad recently by his cheek. Fatigue - pt states she feels more tired. No known blood in stool or urine.    Past Medical History:     Past Medical History:   Diagnosis Date    Anemia as a teen    Cerebral artery occlusion with cerebral infarction ([facility] [address]     \"mini stroke\"    Crohn's     Headache, classical migraine     migraines    Hyperlipidemia     Nausea & vomiting     Problems with head, neck, and trunk       Reviewed all health maintenance requirements and ordered appropriate tests  Health Maintenance Due   Topic Date Due    Hepatitis C screen  Never done    HIV screen  Never done    Diabetes screen  Never done    Shingles Vaccine (1 of 2) Never done    DTaP/Tdap/Td vaccine (2 - Td or Tdap) 02/20/2018    Flu vaccine (1) 09/01/2021       Past Surgical History:     Past Surgical History:   Procedure Laterality Date    BACK SURGERY      cervical spine injections    BLADDER SURGERY  1972    CARPAL TUNNEL RELEASE Right     right dequervains also    CERVICAL SPINE SURGERY  2004    HYSTERECTOMY, TOTAL ABDOMINAL  2001    OVARY REMOVAL  2008    SHOULDER SURGERY  2003        Medications:       Prior to Admission medications    Medication Sig Start Date End Date Taking? Authorizing Provider   Rimegepant Sulfate (NURTEC) 75 MG TBDP Take 75 mg by mouth every other day 2/23/22  Yes Yvonne Stoddard MD   escitalopram (LEXAPRO) 20 MG tablet TAKE 1 TABLET BY MOUTH ONE TIME A DAY 7/13/21  Yes Rickey Vela DO   tiZANidine (ZANAFLEX) 4 MG tablet TAKE TWO TABLETS BY MOUTH EVERY EVENING 7/9/21  Yes Rickey Vela DO   topiramate (TOPAMAX) 50 MG tablet TAKE ONE TABLET BY MOUTH TWO TIMES A DAY 7/9/21  Yes Rickey Vela DO   ondansetron (ZOFRAN ODT) 4 MG disintegrating tablet Take 1 tablet by mouth every 8 hours as needed for Nausea or Vomiting 7/9/21  Yes Giuliana hCu DO   ibuprofen (ADVIL;MOTRIN) 800 MG tablet Take 1 tablet by mouth every 8 hours as needed for Pain 5/11/17  Yes Lopez Santos DO        Allergies:       Sulfa antibiotics    Social History:     Tobacco:    reports that she has never smoked. She has never used smokeless tobacco.  Alcohol:      reports no history of alcohol use. Drug Use:  reports no history of drug use. Family History:     History reviewed. No pertinent family history. Review of Systems:       Review of Systems   Constitutional: Negative for fever. HENT: Positive for ear pain and rhinorrhea. Negative for ear discharge and sore throat. Respiratory: Negative for cough. Gastrointestinal: Negative for bowel incontinence, diarrhea and vomiting. Genitourinary: Positive for bladder incontinence. Musculoskeletal: Positive for back pain. Skin: Negative for rash. Rt cheek skin lesion   Neurological: Positive for headaches. Negative for dizziness, tingling, weakness, numbness and paresthesias. Physical Exam:     Physical Exam  Vitals reviewed. Constitutional:       General: She is not in acute distress. Appearance: Normal appearance. She is well-developed. She is not ill-appearing. HENT:      Head: Normocephalic and atraumatic. Comments: Rt TM Is full but no erythema   Eyes:      General:         Right eye: No discharge. Left eye: No discharge. Conjunctiva/sclera: Conjunctivae normal.   Neck:      Thyroid: No thyromegaly. Cardiovascular:      Rate and Rhythm: Normal rate and regular rhythm. Heart sounds: Normal heart sounds. No murmur heard. Pulmonary:      Effort: Pulmonary effort is normal.      Breath sounds: Normal breath sounds. Abdominal:      General: Bowel sounds are normal.      Palpations: Abdomen is soft. Tenderness: There is no abdominal tenderness. Musculoskeletal:      Cervical back: Neck supple. Lumbar back: Tenderness present. No swelling, edema or bony tenderness. Decreased range of motion. Right lower leg: No edema. Left lower leg: No edema. Lymphadenopathy:      Cervical: No cervical adenopathy. Skin:     Findings: Rash present. No erythema. Comments: A- 1cm circular erythematous macule with circular papules   Neurological:      General: No focal deficit present. Mental Status: She is alert and oriented to person, place, and time. Mental status is at baseline.    Psychiatric:         Mood and Affect: Mood normal.         Behavior: Behavior normal.         Vitals:  /80   Pulse 76   Wt 162 lb (73.5 kg)   SpO2 94%   BMI 32.72 kg/m²       Data:     Lab Results   Component Value Date     04/16/2019    K 3.9 04/16/2019     04/16/2019    CO2 18 04/16/2019    BUN 18 04/16/2019    CREATININE 0.70 04/16/2019    GLUCOSE 105 03/05/2020    PROT 8.3 04/16/2019    LABALBU 4.9 04/16/2019    BILITOT 0.68 04/16/2019    ALKPHOS 100 04/16/2019    AST 22 04/16/2019    ALT 28 04/16/2019     Lab Results   Component Value Date    WBC 8.8 04/16/2019    RBC 5.06 04/16/2019    HGB 15.1 04/16/2019    HCT 45.6 04/16/2019    MCV 90.2 04/16/2019    MCH 29.8 04/16/2019    MCHC 33.0 04/16/2019    RDW 13.9 04/16/2019     04/16/2019    MPV NOT REPORTED 04/16/2019     Lab Results   Component Value Date    TSH 4.16 07/22/2015     Lab Results   Component Value Date    CHOL 229 03/05/2020    HDL 66 03/05/2020          Assessment/Plan:        1. Chronic bilateral low back pain without sciatica  Reviewed old MRI and pt is going to go back to doing her back stretches but if not better then pt to consider seeing Dr Mikhail Souza for pain mgn    2. Migraine with aura and without status migrainosus, not intractable  Pt to try nurtec one every other day for prevention and pt to call if helps to get actual Rx as 8 samples given   - Rimegepant Sulfate (NURTEC) 75 MG TBDP; Take 75 mg by mouth every other day  Dispense: 8 tablet; Refill: 0    3. Eustachian tube dysfunction, right  Pt to increase zyrtec to bid and stay on flonase spray and if not better in few weeks consider ENT referal     4. Skin lesion of cheek  D/ w pt lesion differential is ring worm vs a dry patch. Pt has lotrimin cream to apply BID and if not better in 1-2wks let me know    5. Other fatigue  Will ck labs CBC and TSH  - TSH; Future  - CBC with Auto Differential; Future    6. Screening cholesterol level  Screening labs   - Lipid Panel; Future    7. Diabetes mellitus screening  Screening labs   - Glucose, Random; Future        Return if symptoms worsen or fail to improve.       Electronically signed by Faheem Barrow MD on 2/23/2022 at 10:35 PM

## 2022-02-25 DIAGNOSIS — F41.9 ANXIETY: ICD-10-CM

## 2022-02-25 DIAGNOSIS — F41.0 PANIC ATTACKS: ICD-10-CM

## 2022-02-25 DIAGNOSIS — F32.9 REACTIVE DEPRESSION: ICD-10-CM

## 2022-02-25 RX ORDER — ESCITALOPRAM OXALATE 20 MG/1
TABLET ORAL
Qty: 90 TABLET | Refills: 1 | Status: SHIPPED | OUTPATIENT
Start: 2022-02-25 | End: 2022-10-20 | Stop reason: SDUPTHER

## 2022-02-25 NOTE — TELEPHONE ENCOUNTER
Last OV: 2/23/2022 chronic migraine 07/31/21 chronic  Last RX:    Next scheduled apt: no future appointment           Pt requesting refill

## 2022-02-25 NOTE — TELEPHONE ENCOUNTER
Patient needs new script for Lexapro - patient uses [address] Maintenance   Topic Date Due    Hepatitis C screen  Never done    HIV screen  Never done    Diabetes screen  Never done    Shingles Vaccine (1 of 2) Never done    DTaP/Tdap/Td vaccine (2 - Td or Tdap) 02/20/2018    Flu vaccine (1) 09/01/2021    Depression Monitoring  07/09/2022    Breast cancer screen  10/08/2022    Colorectal Cancer Screen  07/08/2023    Lipid screen  03/05/2025    COVID-19 Vaccine  Completed    Hepatitis A vaccine  Aged Out    Hepatitis B vaccine  Aged Out    Hib vaccine  Aged Out    Meningococcal (ACWY) vaccine  Aged Out    Pneumococcal 0-64 years Vaccine  Aged Out             (applicable per patient's age: Cancer Screenings, Depression Screening, Fall Risk Screening, Immunizations)    LDL Cholesterol (mg/dL)   Date Value   03/05/2020 144 (H)     AST (U/L)   Date Value   04/16/2019 22     ALT (U/L)   Date Value   04/16/2019 28     BUN (mg/dL)   Date Value   04/16/2019 18      (goal A1C is < 7)   (goal LDL is <100) need 30-50% reduction from baseline     BP Readings from Last 3 Encounters:   02/23/22 130/80   12/09/21 126/80   07/13/21 110/74    (goal /80)      All Future Testing planned in [facility]:  Lab Frequency Next Occurrence   Lipid Panel Once 05/23/2022   Glucose, Random Once 05/23/2022   TSH Once 03/09/2022   CBC with Auto Differential Once 03/09/2022       Next Visit Date:  No future appointments.          Patient Active Problem List:     Anemia     Panic attacks     Anxiety     Radiculopathy, cervical region     Paresthesia and pain of both upper extremities     Arthropathy of cervical facet joint     Migraine without status migrainosus, not intractable     Mixed hyperlipidemia     Precordial pain

## 2022-03-02 ENCOUNTER — HOSPITAL ENCOUNTER (OUTPATIENT)
Age: 57
Discharge: HOME | End: 2022-03-02
Payer: COMMERCIAL

## 2022-03-02 ENCOUNTER — TELEPHONE (OUTPATIENT)
Dept: FAMILY MEDICINE CLINIC | Age: 57
End: 2022-03-02

## 2022-03-02 DIAGNOSIS — R79.89 ELEVATED TSH: Primary | ICD-10-CM

## 2022-03-02 DIAGNOSIS — Z13.220 SCREENING CHOLESTEROL LEVEL: ICD-10-CM

## 2022-03-02 DIAGNOSIS — G43.109 MIGRAINE WITH AURA AND WITHOUT STATUS MIGRAINOSUS, NOT INTRACTABLE: ICD-10-CM

## 2022-03-02 DIAGNOSIS — R53.83 OTHER FATIGUE: ICD-10-CM

## 2022-03-02 DIAGNOSIS — Z13.1 DIABETES MELLITUS SCREENING: ICD-10-CM

## 2022-03-02 LAB
ABSOLUTE EOS #: 0.1 K/UL (ref 0–0.4)
ABSOLUTE LYMPH #: 2 K/UL (ref 1–4.8)
ABSOLUTE MONO #: 0.5 K/UL (ref 0–1)
BASOPHILS # BLD: 1 % (ref 0–2)
BASOPHILS ABSOLUTE: 0 K/UL (ref 0–0.2)
CHOLESTEROL/HDL RATIO: 5.3
CHOLESTEROL: 231 MG/DL
DIFFERENTIAL TYPE: YES
EOSINOPHILS RELATIVE PERCENT: 2 % (ref 0–5)
GLUCOSE BLD-MCNC: 108 MG/DL (ref 70–99)
HCT VFR BLD CALC: 38.6 % (ref 36–46)
HDLC SERPL-MCNC: 44 MG/DL
HEMOGLOBIN: 13.2 G/DL (ref 12–16)
LDL CHOLESTEROL: 167 MG/DL (ref 0–130)
LYMPHOCYTES # BLD: 37 % (ref 15–40)
MCH RBC QN AUTO: 30.3 PG (ref 26–34)
MCHC RBC AUTO-ENTMCNC: 34.2 G/DL (ref 31–37)
MCV RBC AUTO: 88.7 FL (ref 80–100)
MONOCYTES # BLD: 9 % (ref 4–8)
PDW BLD-RTO: 13.4 % (ref 12.1–15.2)
PLATELET # BLD: 269 K/UL (ref 140–450)
RBC # BLD: 4.35 M/UL (ref 4–5.2)
SEG NEUTROPHILS: 51 % (ref 47–75)
SEGMENTED NEUTROPHILS ABSOLUTE COUNT: 2.9 K/UL (ref 2.5–7)
TRIGL SERPL-MCNC: 99 MG/DL
TSH SERPL DL<=0.05 MIU/L-ACNC: 5.4 MIU/L (ref 0.3–5)
WBC # BLD: 5.6 K/UL (ref 3.5–11)

## 2022-03-02 PROCEDURE — 85025 COMPLETE CBC W/AUTO DIFF WBC: CPT

## 2022-03-02 PROCEDURE — 84443 ASSAY THYROID STIM HORMONE: CPT

## 2022-03-02 PROCEDURE — 80061 LIPID PANEL: CPT

## 2022-03-02 PROCEDURE — 82947 ASSAY GLUCOSE BLOOD QUANT: CPT

## 2022-03-02 PROCEDURE — 36415 COLL VENOUS BLD VENIPUNCTURE: CPT

## 2022-03-02 RX ORDER — RIMEGEPANT SULFATE 75 MG/75MG
75 TABLET, ORALLY DISINTEGRATING ORAL EVERY OTHER DAY
Qty: 15 TABLET | Refills: 3 | Status: SHIPPED | OUTPATIENT
Start: 2022-03-02

## 2022-03-02 NOTE — TELEPHONE ENCOUNTER
Patient informed of lab results with Dr Ham Standing recommendations. Nurtec working well for headaches, requests rx to  pharmacy.

## 2022-03-02 NOTE — TELEPHONE ENCOUNTER
----- Message from Suzette Duran MD sent at 3/2/2022  4:18 PM EST -----  Tell pt that her cholesterol was still a little high 231 and  so needs to improve more low fat diet. Her numbers are not high enough that a statin is recommended so just diet controlled. Her cbc ok and glucose 108 but her TSH -- thyroid was a little elevated at 5.40 so I would like to thierry TSH in 3mos as only a little above the 5 range and see if improves or keeps going up.

## 2022-04-20 ENCOUNTER — TELEPHONE (OUTPATIENT)
Dept: FAMILY MEDICINE CLINIC | Age: 57
End: 2022-04-20

## 2022-04-20 DIAGNOSIS — M54.12 RADICULOPATHY, CERVICAL REGION: Primary | ICD-10-CM

## 2022-04-20 NOTE — TELEPHONE ENCOUNTER
Ok to use Neck pain and do an order for [facility] physical therapy and in the notes write -- DRY NEEDLING (you can pend and I will sign)

## 2022-05-02 ENCOUNTER — HOSPITAL ENCOUNTER (OUTPATIENT)
Dept: PHYSICAL THERAPY | Age: 57
Setting detail: THERAPIES SERIES
Discharge: HOME | End: 2022-05-02
Payer: COMMERCIAL

## 2022-05-02 PROCEDURE — 97162 PT EVAL MOD COMPLEX 30 MIN: CPT

## 2022-05-02 PROCEDURE — 20561 NDL INSJ W/O NJX 3+ MUSC: CPT

## 2022-05-02 ASSESSMENT — PAIN SCALES - GENERAL: PAINLEVEL_OUTOF10: 5

## 2022-05-02 NOTE — PROGRESS NOTES
Phone: [phone]         Fax: [phone]                      Outpatient Physical Therapy                                                                      Evaluation    Date: 2022  Patient: Durga Mccollum  : 1965  ACCT #: [de-identified]    Referring Practitioner:Referring Provider (secondary): Dr. Fernando Lockwood      Referral Date: 22      Diagnosis: Radiculopathy Cervical region    Treatment Diagnosis: Neck pain and L UE pain  Onset Date: 22    Per Physician Order  Total # of Visits to Date: 1  No Show: 0  Canceled Appointment: 0     Subjective  Additional Pertinent Hx: Pain and Numbness started 1-2months ago and has progressed since inital.  MRI of C-spine 2013 noted disc degen at C5 Pt reports that her pain starts in L medial scap border and radiates to her neck. Pt has numbness of lateral 3 digits of her Left hand. Pt has been having a dull HA everyday( possible increase from her baseline). Pt has been dealing with migraines. Pt report no weakness. Difficulty driving due to pain and tingling of the L UE.   Pain Assessment  Pain Level: 5    Objective  Spine  Cervical: Flexion-35deg, Extension-35deg, Sidebend: R=35deg, L=20deg; Rotation: R=52deg, L=55deg  Strength RUE  Strength RUE: Exception  R Shoulder Flexion: 3+/5  R Shoulder Extension: 4+/5  R Shoulder Horizontal ABduction: 3+/5  Strength LUE  L Shoulder Flexion: 3+/5  L Shoulder Extension: 4+/5  L Shoulder Horizontal ABduction: 3+/5    Assessment  Body Structures, Functions, Activity Limitations Requiring Skilled Therapeutic Intervention: Decreased functional mobility ,Decreased ROM,Decreased ADL status,Decreased posture,Increased pain,Decreased high-level IADLs  Therapy Prognosis: Good    Clinical Presentation:  Evolving  The Following Comorbities will impact the patients progression and Plan of Care:   Previous Orthopedic Injury/Surgery    Medium Complexity    Education: POC; Work place ergonomic set-up, Dry Needling consent     Goals  Short Term Goals  Time Frame for Short term goals: 6 visits  Short term goal 1: Pt to report independence and compliance with workspace ergonomics  Short term goal 2: Pt to report independence and compliance with HEP for postural strengthening. Short term goal 3: Pt to have Cervical Ext =45deg to improve sleeping sg    Long Term Goals  Time Frame for Long term goals : 12 visits  Long term goal 1: Pt to score <15/50 on NDI to improve ADL and work sg. Long term goal 2: Pt to have no L UE tingling x 4 consecutive days to improve driving sg. Long term goal 3: Pt to have 4/5 Horiz ABD strength with MMT to improve posture. Long term goal 4: Pt to report worst pain 2/10 x1 wk to improve ADL Sg. Long term goal 5: Pt to have 70deg B/L Cervical rotation to improve driving safety.     Patient's Goal:  Patient goals : Reduce arm pain/numbness    Timed Code Treatment Minutes: 0 Minutes  Time In: 6352  Time Out: 1005  Total Time: [address], PT Date: 5/2/2022

## 2022-05-02 NOTE — PLAN OF CARE
[facility]OR[facility]RES       Phone: [phone]   Date: 2022                      Outpatient Physical Therapy  Fax: [phone] #: [de-identified]                     Plan of Care  [facility]#: [id number]  Patient: Sam Lopez  : 1965    Referring Provider (secondary): Dr. Elvi Travis    Referral Date: 22    Diagnosis: Radiculopathy Cervical region  Onset Date: 22  Treatment Diagnosis: Neck pain and L UE pain    Assessment  Body Structures, Functions, Activity Limitations Requiring Skilled Therapeutic Intervention: Decreased functional mobility ,Decreased ROM,Decreased ADL status,Decreased posture,Increased pain,Decreased high-level IADLs  Therapy Prognosis: Good    Treatment Plan   Days:  (1-2) times per week Weeks: 8 weeks Total # of Visits Approved: 12    Patient Education/HEP, Back Education, Therapeutic Exercise, Manual Therapy: Myofacial Release/Cupping, Manual Therapy: Mobilization/Manipulation, Traction, Dry Needling and HP/CP     Goals  Short Term Goals  Time Frame for Short term goals: 6 visits  Short term goal 1: Pt to report independence and compliance with workspace ergonomics  Short term goal 2: Pt to report independence and compliance with HEP for postural strengthening. Short term goal 3: Pt to have Cervical Ext =45deg to improve sleeping juani  Long Term Goals  Time Frame for Long term goals : 12 visits  Long term goal 1: Pt to score <15/50 on NDI to improve ADL and work juani. Long term goal 2: Pt to have no L UE tingling x 4 consecutive days to improve driving juani. Long term goal 3: Pt to have 4/5 Horiz ABD strength with MMT to improve posture. Long term goal 4: Pt to report worst pain 2/10 x1 wk to improve ADL Juani. Long term goal 5: Pt to have 70deg B/L Cervical rotation to improve driving safety.      Irene Daniels, PT   Date: 2022    ______________________________________ Date: 2022  Physician Signature  By signing above or cosigning electronically, I have reviewed this Plan of Care and certify a need for medically necessary rehabilitation services.

## 2022-05-09 ENCOUNTER — HOSPITAL ENCOUNTER (OUTPATIENT)
Dept: PHYSICAL THERAPY | Age: 57
Setting detail: THERAPIES SERIES
Discharge: HOME | End: 2022-05-09
Payer: COMMERCIAL

## 2022-05-09 PROCEDURE — 20561 NDL INSJ W/O NJX 3+ MUSC: CPT

## 2022-05-09 ASSESSMENT — PAIN SCALES - GENERAL: PAINLEVEL_OUTOF10: 5

## 2022-05-09 NOTE — PROGRESS NOTES
Phone: [phone]      Fax: [phone]                            Outpatient Physical Therapy                                                                            Daily Note    Date: 2022  Patient Name: Tavo Andersen        MRN: [id number]   ACCT#:  [de-identified]  : 1965  (65 y.o.)    Referring Provider (secondary): Dr. Fernanda Smith         Diagnosis: Radiculopathy Cervical region  Treatment Diagnosis: Neck pain and L UE pain    Onset Date: 22  Total # of Visits Approved: 12 Per Physician Order  Total # of Visits to Date: 2  No Show: 0  Canceled Appointment: 0    Pre-Treatment Pain:  4/10     Assessment  Assessment: Pt reports HA post last session, left sided mostly. Pt reports good sg overall. Pt states she did note relief of tension post last session. Added additional needles this date with good sg also added stim 30sec. Pt reports slight decreased in HA post session. L UE and hand continue to be constant with tingling. Plan  Continue with current plan of care    Exercises/Modalities/Manual:  See DocFlow Sheet    Education: Heat if soreness occurs          Goals  (Total # of Visits to Date: 2)   Short Term Goals - Time Frame for Short term goals: 6 visits  Short Term Goals  Time Frame for Short term goals: 6 visits  Short term goal 1: Pt to report independence and compliance with workspace ergonomics  Short term goal 2: Pt to report independence and compliance with HEP for postural strengthening. Short term goal 3: Pt to have Cervical Ext =45deg to improve sleeping sg    Long Term Goals - Time Frame for Long term goals : 12 visits  Long Term Goals  Time Frame for Long term goals : 12 visits  Long term goal 1: Pt to score <15/50 on NDI to improve ADL and work sg. Long term goal 2: Pt to have no L UE tingling x 4 consecutive days to improve driving sg. Long term goal 3: Pt to have 4/5 Horiz ABD strength with MMT to improve posture.   Long term goal 4: Pt to report worst pain 2/10 x1 wk to improve ADL Juani. Long term goal 5: Pt to have 70deg B/L Cervical rotation to improve driving safety.     Post Treatment Pain:  3/10    Time In: 0850  Time Out: 0930  Timed Code Treatment Minutes: 0 Minutes   Total time: Bo Pedroza PT     Date: 5/9/2022

## 2022-05-16 ENCOUNTER — HOSPITAL ENCOUNTER (OUTPATIENT)
Dept: PHYSICAL THERAPY | Age: 57
Setting detail: THERAPIES SERIES
Discharge: HOME | End: 2022-05-16
Payer: COMMERCIAL

## 2022-05-16 PROCEDURE — 20561 NDL INSJ W/O NJX 3+ MUSC: CPT

## 2022-05-16 ASSESSMENT — PAIN SCALES - GENERAL: PAINLEVEL_OUTOF10: 5

## 2022-05-16 NOTE — PROGRESS NOTES
Phone: [phone]      Fax: [phone]                            Outpatient Physical Therapy                                                                            Daily Note    Date: 2022  Patient Name: Chencho Lujan        MRN: [id number]   ACCT#:  [de-identified]  : 1965  (65 y.o.)    Referring Provider (secondary): Dr. Edie White         Diagnosis: Radiculopathy Cervical region  Treatment Diagnosis: Neck pain and L UE pain    Onset Date: 22  Total # of Visits Approved: 12 Per Physician Order  Total # of Visits to Date: 3  No Show: 0  Canceled Appointment: 0    Pre-Treatment Pain:  5/10     Assessment  Assessment: Pt reports no overall change in regard to HAs or lateral hand numbness. Pt noted reproduction of symptoms in hand with posterior shoulder and cubital tunnel. Pt reports less pain and less tension post pain. Plan  Continue with current plan of care    Exercises/Modalities/Manual:  See DocFlow Sheet    Education: additional needle points    Goals  (Total # of Visits to Date: 3)  Short Term Goals - Time Frame for Short term goals: 6 visits  Short Term Goals  Time Frame for Short term goals: 6 visits  Short term goal 1: Pt to report independence and compliance with workspace ergonomics  Short term goal 2: Pt to report independence and compliance with HEP for postural strengthening. Short term goal 3: Pt to have Cervical Ext =45deg to improve sleeping juani    Long Term Goals - Time Frame for Long term goals : 12 visits  Long Term Goals  Time Frame for Long term goals : 12 visits  Long term goal 1: Pt to score <15/50 on NDI to improve ADL and work juani. Long term goal 2: Pt to have no L UE tingling x 4 consecutive days to improve driving juani. Long term goal 3: Pt to have 4/5 Horiz ABD strength with MMT to improve posture. Long term goal 4: Pt to report worst pain 2/10 x1 wk to improve ADL Juani.   Long term goal 5: Pt to have 70deg B/L Cervical rotation to improve driving safety.     Post Treatment Pain:  3/10    Time In: 0806  Time Out: 0846  Timed Code Treatment Minutes: 0 Minutes  Total Treatment Time: 36 Minutes    Alex Capps, PT     Date: 5/16/2022

## 2022-05-23 ENCOUNTER — HOSPITAL ENCOUNTER (OUTPATIENT)
Dept: PHYSICAL THERAPY | Age: 57
Setting detail: THERAPIES SERIES
Discharge: HOME | End: 2022-05-23
Payer: COMMERCIAL

## 2022-05-23 PROCEDURE — 20561 NDL INSJ W/O NJX 3+ MUSC: CPT

## 2022-05-23 NOTE — PROGRESS NOTES
Phone: [address]      Fax: [phone]                            Outpatient Physical Therapy                                                                            Daily Note    Date: 2022  Patient Name: Harper Thompson        MRN: [id number]   ACCT#:  [de-identified]  : 1965  (65 y.o.)    Referring Provider (secondary): Dr. Josselin Wei         Diagnosis: Radiculopathy Cervical region  Treatment Diagnosis: Neck pain and L UE pain    Onset Date: 22  Total # of Visits Approved: 12 Per Physician Order  Total # of Visits to Date: 4  No Show: 0  Canceled Appointment: 0    Pre-Treatment Pain:  0/10     Assessment  Assessment: Pt reports since last visit no longer with constant numbness. Pt reports still slight sensory change in lateral 2 fingers. Pt reports that she has not had any neck/posterior HAs. Only sinus related. Pt very pleased with progress. Pt states that post treatment she has some minimal soreness but still not numbness. Plan  Continue with current plan of care    Exercises/Modalities/Manual:  See DocFlow Sheet    Education: May add stim next visit          Goals  (Total # of Visits to Date: 4)   Short Term Goals  Time Frame for Short term goals: 6 visits  Short term goal 1: Pt to report independence and compliance with workspace ergonomics  Short term goal 2: Pt to report independence and compliance with HEP for postural strengthening. Short term goal 3: Pt to have Cervical Ext =45deg to improve sleeping juani    Long Term Goals  Time Frame for Long term goals : 12 visits  Long term goal 1: Pt to score <15/50 on NDI to improve ADL and work juani. Long term goal 2: Pt to have no L UE tingling x 4 consecutive days to improve driving juani. Long term goal 3: Pt to have 4/5 Horiz ABD strength with MMT to improve posture. Long term goal 4: Pt to report worst pain 2/10 x1 wk to improve ADL Juani.   Long term goal 5: Pt to have 70deg B/L Cervical rotation to improve driving safety.     Post Treatment Pain: 0/10    Time In: 0806  Time Out: 0846  Timed Code Treatment Minutes: 0 Minutes  Total Treatment Time: 36 Minutes    Kilo Welch, PT     Date: 5/23/2022

## 2022-06-03 ENCOUNTER — HOSPITAL ENCOUNTER (OUTPATIENT)
Dept: PHYSICAL THERAPY | Age: 57
Setting detail: THERAPIES SERIES
Discharge: HOME | End: 2022-06-03
Payer: COMMERCIAL

## 2022-06-03 PROCEDURE — 20561 NDL INSJ W/O NJX 3+ MUSC: CPT

## 2022-06-03 NOTE — PROGRESS NOTES
Phone: Nicola Parker      Fax: [phone]                            Outpatient Physical Therapy                                                                            Daily Note    Date: 6/3/2022  Patient Name: Tavo Andersen        MRN: [id number]   ACCT#:  [de-identified]  : 1965  (65 y.o.)    Referring Provider (secondary): Dr. Fernanda Smith         Diagnosis: Radiculopathy Cervical region  Treatment Diagnosis: Neck pain and L UE pain    Onset Date: 22  Total # of Visits Approved: 12 Per Physician Order  Total # of Visits to Date: 5  No Show: 0  Canceled Appointment: 0    Pre-Treatment Pain:  0/10     Assessment  Assessment: Pt reports waking 1 time with numbness but then it resolved a few hours later since last session. AROM Cervical rotation R=73deg, L=71deg. Plan to cont x1 visit then will place pt on hold. Pt to call facility if need to be seen due to symptom return. Plan  Continue with current plan of care    Exercises/Modalities/Manual:  See DocFlow Sheet    Education: see assessment          Goals  (Total # of Visits to Date: 5)   Short Term Goals  Time Frame for Short term goals: 6 visits  Short term goal 1: Pt to report independence and compliance with workspace ergonomics  Short term goal 2: Pt to report independence and compliance with HEP for postural strengthening. Short term goal 3: Pt to have Cervical Ext =45deg to improve sleeping juani    Long Term Goals  Time Frame for Long term goals : 12 visits  Long term goal 1: Pt to score <15/50 on NDI to improve ADL and work juani. Long term goal 2: Pt to have no L UE tingling x 4 consecutive days to improve driving juani. Long term goal 3: Pt to have 4/5 Horiz ABD strength with MMT to improve posture. Long term goal 4: Pt to report worst pain 2/10 x1 wk to improve ADL Juani. Long term goal 5: Pt to have 70deg B/L Cervical rotation to improve driving safety.   LTG Goal 5 Status[de-identified] Met    Post Treatment Pain: 0/10      Time In: 0805  Time Out: 0845  Timed Code Treatment Minutes: 0 Minutes  Total Treatment Time: Bo Pedroza PT     Date: 6/3/2022

## 2022-06-06 ENCOUNTER — HOSPITAL ENCOUNTER (OUTPATIENT)
Dept: PHYSICAL THERAPY | Age: 57
Setting detail: THERAPIES SERIES
Discharge: HOME | End: 2022-06-06
Payer: COMMERCIAL

## 2022-06-06 PROCEDURE — 20561 NDL INSJ W/O NJX 3+ MUSC: CPT

## 2022-06-06 NOTE — PROGRESS NOTES
Phone: Nicola Parker      Fax: [phone]                            Outpatient Physical Therapy                                                                            Daily Note    Date: 2022  Patient Name: Tavo Andersen        MRN: [id number]   ACCT#:  [de-identified]  : 1965  (65 y.o.)    Referring Provider (secondary): Dr. Fernanda Smith         Diagnosis: Radiculopathy Cervical region  Treatment Diagnosis: Neck pain and L UE pain    Onset Date: 22  Total # of Visits Approved: 12 Per Physician Order  Total # of Visits to Date: 6  No Show: 0  Canceled Appointment: 0    Pre-Treatment Pain:  0/10     Assessment  Assessment: Pt reports sleeping is much better, getting more deep sleep. Pt reports even with [place] no sig change in pain and no tingling in hand. Pt very pleased with progress with dry needling will follow up with pt in 2wks unless pt calls departement prior. Plan  Place pt on hold x2wks    Exercises/Modalities/Manual:  See DocFlow Sheet    Education: Hold x2 wks but if needs arise call PT dept and scheduled with Philip Polo, PT       Goals  (Total # of Visits to Date: 6)   Short Term Goals  Time Frame for Short term goals: 6 visits  Short term goal 1: Pt to report independence and compliance with workspace ergonomics  Short term goal 2: Pt to report independence and compliance with HEP for postural strengthening. Short term goal 3: Pt to have Cervical Ext =45deg to improve sleeping juani    Long Term Goals  Time Frame for Long term goals : 12 visits  Long term goal 1: Pt to score <15/50 on NDI to improve ADL and work juani. Long term goal 2: Pt to have no L UE tingling x 4 consecutive days to improve driving juani. LTG Goal 2 Status[de-identified] Met  Long term goal 3: Pt to have 4/5 Horiz ABD strength with MMT to improve posture. Long term goal 4: Pt to report worst pain 2/10 x1 wk to improve ADL Juani.   LTG Goal 4 Status[de-identified] Met  Long term goal 5: Pt to have 70deg B/L Cervical rotation to improve driving safety.   LTG Goal 5 Status[de-identified] Met    Post Treatment Pain:  0/10    Time In: 6377  Time Out : 3376  Timed Code Treatment Minutes: 0 Minutes  Total Treatment Time: Bo Pedroza PT     Date: 6/6/2022

## 2022-07-19 NOTE — DISCHARGE SUMMARY
Phone: [address]             Fax: [phone]                            Outpatient Physical Therapy                                                                    Discharge Summary    Patient: Kaz Wiggins  : 1965  ACCT #: [de-identified]   Referring Provider (secondary): Dr. Carlos Renner      Diagnosis: Radiculopathy Cervical region    Date Treatment Initiated: 22  Date of Last Treatment: 22    PT Visit Information  Onset Date: 22  Total # of Visits Approved: 12  Total # of Visits to Date: 6  No Show: 0  Canceled Appointment: 0    Frequency/Duration  Days:  (1-2) times per week  Weeks: 8 weeks    Treatment Received  Patient Education/HEP, Therapeutic Exercise, Dry Needling, and HP/CP      Assessment  Assessment: Pt reports sleeping is much better, getting more deep sleep. Pt reports even with [place] no sig change in pain and no tingling in hand. Pt very pleased with progress with dry needling. Followed up with pt and numbness remains gone. Will D/C at this time. Reason for Discharge  Optimal Function Achieved    Comments:   Thank you for this referral      Claudio Waldron, PT  Date: 2022

## 2022-10-20 DIAGNOSIS — G43.109 MIGRAINE WITH AURA AND WITHOUT STATUS MIGRAINOSUS, NOT INTRACTABLE: ICD-10-CM

## 2022-10-20 DIAGNOSIS — F41.9 ANXIETY: ICD-10-CM

## 2022-10-20 DIAGNOSIS — F41.0 PANIC ATTACKS: ICD-10-CM

## 2022-10-20 DIAGNOSIS — F32.9 REACTIVE DEPRESSION: ICD-10-CM

## 2022-10-20 RX ORDER — TIZANIDINE 4 MG/1
TABLET ORAL
Qty: 180 TABLET | Refills: 3 | Status: SHIPPED | OUTPATIENT
Start: 2022-10-20

## 2022-10-20 RX ORDER — ESCITALOPRAM OXALATE 20 MG/1
TABLET ORAL
Qty: 90 TABLET | Refills: 1 | Status: SHIPPED | OUTPATIENT
Start: 2022-10-20

## 2022-10-20 NOTE — TELEPHONE ENCOUNTER
Last OV: 2/23/2022 07/09/21 chronic   Last RX:    Next scheduled apt: 11/21/2022   chronic         Pt requesting a refill

## 2022-10-20 NOTE — TELEPHONE ENCOUNTER
Patient asking for new scripts for Zanaflex & Lexapro - patient uses [facility] - will have new insurance starting in November and she promises that she will make an appointment once she gets that    Health Maintenance   Topic Date Due    HIV screen  Never done    Hepatitis C screen  Never done    Shingles vaccine (1 of 2) Never done    DTaP/Tdap/Td vaccine (2 - Td or Tdap) 02/20/2018    COVID-19 Vaccine (4 - Booster for Moderna series) 04/01/2022    Depression Monitoring  07/09/2022    Flu vaccine (1) 08/01/2022    Breast cancer screen  10/08/2022    Colorectal Cancer Screen  07/08/2023    Lipids  03/02/2027    Hepatitis A vaccine  Aged Out    Hib vaccine  Aged Out    Meningococcal (ACWY) vaccine  Aged Out    Pneumococcal 0-64 years Vaccine  Aged Out             (applicable per patient's age: Cancer Screenings, Depression Screening, Fall Risk Screening, Immunizations)    LDL Cholesterol (mg/dL)   Date Value   03/02/2022 167 (H)     AST (U/L)   Date Value   04/16/2019 22     ALT (U/L)   Date Value   04/16/2019 28     BUN (mg/dL)   Date Value   04/16/2019 18      (goal A1C is < 7)   (goal LDL is <100) need 30-50% reduction from baseline     BP Readings from Last 3 Encounters:   02/23/22 130/80   12/09/21 126/80   07/13/21 110/74    (goal /80)      All Future Testing planned in [facility]:  Lab Frequency Next Occurrence   TSH Once 06/02/2022       Next Visit Date:  No future appointments.          Patient Active Problem List:     Anemia     Panic attacks     Anxiety     Radiculopathy, cervical region     Paresthesia and pain of both upper extremities     Arthropathy of cervical facet joint     Migraine without status migrainosus, not intractable     Mixed hyperlipidemia     Precordial pain

## 2022-11-21 ENCOUNTER — OFFICE VISIT (OUTPATIENT)
Dept: FAMILY MEDICINE CLINIC | Age: 57
End: 2022-11-21
Payer: COMMERCIAL

## 2022-11-21 VITALS
DIASTOLIC BLOOD PRESSURE: 86 MMHG | HEART RATE: 70 BPM | OXYGEN SATURATION: 96 % | WEIGHT: 167 LBS | BODY MASS INDEX: 33.67 KG/M2 | SYSTOLIC BLOOD PRESSURE: 138 MMHG | HEIGHT: 59 IN

## 2022-11-21 DIAGNOSIS — F41.9 ANXIETY: ICD-10-CM

## 2022-11-21 DIAGNOSIS — G43.909 MIGRAINE WITHOUT STATUS MIGRAINOSUS, NOT INTRACTABLE, UNSPECIFIED MIGRAINE TYPE: ICD-10-CM

## 2022-11-21 DIAGNOSIS — E03.9 ACQUIRED HYPOTHYROIDISM: Primary | ICD-10-CM

## 2022-11-21 DIAGNOSIS — Z12.31 ENCOUNTER FOR SCREENING MAMMOGRAM FOR MALIGNANT NEOPLASM OF BREAST: ICD-10-CM

## 2022-11-21 PROCEDURE — 3017F COLORECTAL CA SCREEN DOC REV: CPT | Performed by: FAMILY MEDICINE

## 2022-11-21 PROCEDURE — G8482 FLU IMMUNIZE ORDER/ADMIN: HCPCS | Performed by: FAMILY MEDICINE

## 2022-11-21 PROCEDURE — 99214 OFFICE O/P EST MOD 30 MIN: CPT | Performed by: FAMILY MEDICINE

## 2022-11-21 PROCEDURE — G8417 CALC BMI ABV UP PARAM F/U: HCPCS | Performed by: FAMILY MEDICINE

## 2022-11-21 PROCEDURE — 1036F TOBACCO NON-USER: CPT | Performed by: FAMILY MEDICINE

## 2022-11-21 PROCEDURE — G8427 DOCREV CUR MEDS BY ELIG CLIN: HCPCS | Performed by: FAMILY MEDICINE

## 2022-11-21 RX ORDER — LEVOTHYROXINE SODIUM 0.03 MG/1
25 TABLET ORAL DAILY
Qty: 30 TABLET | Refills: 2 | Status: SHIPPED | OUTPATIENT
Start: 2022-11-21

## 2022-11-21 RX ORDER — ELETRIPTAN HYDROBROMIDE 40 MG/1
40 TABLET, FILM COATED ORAL
Qty: 12 TABLET | Refills: 5 | Status: SHIPPED | OUTPATIENT
Start: 2022-11-21 | End: 2022-11-21

## 2022-11-21 SDOH — ECONOMIC STABILITY: FOOD INSECURITY: WITHIN THE PAST 12 MONTHS, THE FOOD YOU BOUGHT JUST DIDN'T LAST AND YOU DIDN'T HAVE MONEY TO GET MORE.: NEVER TRUE

## 2022-11-21 SDOH — ECONOMIC STABILITY: FOOD INSECURITY: WITHIN THE PAST 12 MONTHS, YOU WORRIED THAT YOUR FOOD WOULD RUN OUT BEFORE YOU GOT MONEY TO BUY MORE.: NEVER TRUE

## 2022-11-21 ASSESSMENT — ENCOUNTER SYMPTOMS
DIARRHEA: 0
FACIAL SWELLING: 0
VOMITING: 0
EYE WATERING: 0
COUGH: 0
EYE PAIN: 0
BLURRED VISION: 0
CONSTIPATION: 0
NAUSEA: 1
EYE REDNESS: 0
SHORTNESS OF BREATH: 0
VISUAL CHANGE: 0
EYE DISCHARGE: 0

## 2022-11-21 ASSESSMENT — PATIENT HEALTH QUESTIONNAIRE - PHQ9
SUM OF ALL RESPONSES TO PHQ QUESTIONS 1-9: 0
SUM OF ALL RESPONSES TO PHQ QUESTIONS 1-9: 0
4. FEELING TIRED OR HAVING LITTLE ENERGY: 0
5. POOR APPETITE OR OVEREATING: 0
1. LITTLE INTEREST OR PLEASURE IN DOING THINGS: 0
2. FEELING DOWN, DEPRESSED OR HOPELESS: 0
SUM OF ALL RESPONSES TO PHQ QUESTIONS 1-9: 0
SUM OF ALL RESPONSES TO PHQ9 QUESTIONS 1 & 2: 0
6. FEELING BAD ABOUT YOURSELF - OR THAT YOU ARE A FAILURE OR HAVE LET YOURSELF OR YOUR FAMILY DOWN: 0
10. IF YOU CHECKED OFF ANY PROBLEMS, HOW DIFFICULT HAVE THESE PROBLEMS MADE IT FOR YOU TO DO YOUR WORK, TAKE CARE OF THINGS AT HOME, OR GET ALONG WITH OTHER PEOPLE: 0
3. TROUBLE FALLING OR STAYING ASLEEP: 0
8. MOVING OR SPEAKING SO SLOWLY THAT OTHER PEOPLE COULD HAVE NOTICED. OR THE OPPOSITE, BEING SO FIGETY OR RESTLESS THAT YOU HAVE BEEN MOVING AROUND A LOT MORE THAN USUAL: 0
7. TROUBLE CONCENTRATING ON THINGS, SUCH AS READING THE NEWSPAPER OR WATCHING TELEVISION: 0
SUM OF ALL RESPONSES TO PHQ QUESTIONS 1-9: 0
9. THOUGHTS THAT YOU WOULD BE BETTER OFF DEAD, OR OF HURTING YOURSELF: 0

## 2022-11-21 ASSESSMENT — SOCIAL DETERMINANTS OF HEALTH (SDOH): HOW HARD IS IT FOR YOU TO PAY FOR THE VERY BASICS LIKE FOOD, HOUSING, MEDICAL CARE, AND HEATING?: NOT HARD AT ALL

## 2022-11-21 NOTE — PROGRESS NOTES
HPI Notes    Name: Gladys Nathan  : 1965        Chief Complaint:     Chief Complaint   Patient presents with    Migraine    Anxiety     Pt takes Lexapro daily    Discuss Labs     Pt would like to discuss labs from [facility].       History of Present Illness:     Gladys Nathan is a 65 y.o.  female who presents with Migraine, Anxiety (Pt takes Lexapro daily), and Discuss Labs (Pt would like to discuss labs from [facility].)      Migraine   This is a chronic problem. The current episode started more than 1 year ago. The problem has been unchanged. The pain is located in the Occipital region. The pain does not radiate. The pain quality is similar to prior headaches. The quality of the pain is described as throbbing. The pain is moderate. Associated symptoms include nausea. Pertinent negatives include no blurred vision, coughing, dizziness, ear pain, eye pain, eye redness, eye watering, fever, numbness, visual change or vomiting. She has tried triptans, acetaminophen, NSAIDs and Excedrin (pt statet Nurtec was helping but expensive. Pt tried topamax in past but didn't help. imitrex did not help in past but Relpax did srikanth.) for the symptoms. The treatment provided mild relief. Her past medical history is significant for migraine headaches and migraines in the family. Anxiety  Presents for follow-up (stable and doing well on the lexapro. Doing well at her new job.) visit. Symptoms include nausea. Patient reports no chest pain, dizziness, palpitations or shortness of breath. The severity of symptoms is mild. The quality of sleep is good. Nighttime awakenings: none. Compliance with medications is %. Hypothyroidism- Chronic/stable, Patient denies changes in weight,bowels,palpitations. Energy is good Last TSH 5.6 with the free T4 0.8 in Oct 2022.    Past Medical History:     Past Medical History:   Diagnosis Date    Anemia as a teen    Cerebral artery occlusion with cerebral infarction ([facility] [address]     \"mini stroke\"    Crohn's     Headache, classical migraine     migraines    Hyperlipidemia     Nausea & vomiting     Problems with head, neck, and trunk       Reviewed all health maintenance requirements and ordered appropriate tests  Health Maintenance Due   Topic Date Due    HIV screen  Never done    Hepatitis C screen  Never done    Diabetes screen  Never done    Shingles vaccine (1 of 2) Never done    DTaP/Tdap/Td vaccine (2 - Td or Tdap) 02/20/2018    COVID-19 Vaccine (4 - Booster for Dana Faes series) 01/26/2022    Breast cancer screen  10/08/2022       Past Surgical History:     Past Surgical History:   Procedure Laterality Date    BACK SURGERY      cervical spine injections    BLADDER SURGERY  1972    CARPAL TUNNEL RELEASE Right     right dequervains also    CERVICAL SPINE SURGERY  2004    HYSTERECTOMY, TOTAL ABDOMINAL (CERVIX REMOVED)  2001    OVARY REMOVAL  2008    SHOULDER SURGERY  2003        Medications:       Prior to Admission medications    Medication Sig Start Date End Date Taking?  Authorizing Provider   eletriptan (RELPAX) 40 MG tablet Take 1 tablet by mouth once as needed (migraine) may repeat in 2 hours if necessary (no more than 2 pills in 24 hours) 11/21/22 11/21/22 Yes Celeste Keith MD   levothyroxine (SYNTHROID) 25 MCG tablet Take 1 tablet by mouth daily 11/21/22  Yes Celeste Keith MD   escitalopram (LEXAPRO) 20 MG tablet TAKE 1 TABLET BY MOUTH ONE TIME A DAY 10/20/22  Yes Celeste Keith MD   tiZANidine (ZANAFLEX) 4 MG tablet TAKE TWO TABLETS BY MOUTH EVERY EVENING 10/20/22  Yes Celeste Keith MD   ondansetron (ZOFRAN ODT) 4 MG disintegrating tablet Take 1 tablet by mouth every 8 hours as needed for Nausea or Vomiting 7/9/21  Yes Andreina Vela,    ibuprofen (ADVIL;MOTRIN) 800 MG tablet Take 1 tablet by mouth every 8 hours as needed for Pain 5/11/17  Yes Lopez Santos, DO   Rimegepant Sulfate (NURTEC) 75 MG TBDP Take 75 mg by mouth every other day  Patient not taking: Reported on 11/21/2022 3/2/22   Celeste Keith MD   topiramate (TOPAMAX) 50 MG tablet TAKE ONE TABLET BY MOUTH TWO TIMES A DAY  Patient not taking: Reported on 11/21/2022 7/9/21   Andreina Vela DO        Allergies:       Sulfa antibiotics    Social History:     Tobacco:    reports that she has never smoked. She has never used smokeless tobacco.  Alcohol:      reports no history of alcohol use. Drug Use:  reports no history of drug use. Family History:     History reviewed. No pertinent family history. Review of Systems:       Review of Systems   Constitutional:  Negative for chills and fever. HENT:  Negative for ear discharge, ear pain and facial swelling. Eyes:  Negative for blurred vision, pain, discharge and redness. Respiratory:  Negative for cough and shortness of breath. Cardiovascular:  Negative for chest pain, palpitations and leg swelling. Gastrointestinal:  Positive for nausea. Negative for constipation, diarrhea and vomiting. Genitourinary:  Negative for difficulty urinating. Musculoskeletal:  Negative for joint swelling and neck stiffness. Skin:  Negative for rash. Neurological:  Positive for headaches. Negative for dizziness, facial asymmetry, speech difficulty, light-headedness and numbness. Psychiatric/Behavioral:  Negative for sleep disturbance. Physical Exam:     Physical Exam  Vitals reviewed. Constitutional:       General: She is not in acute distress. Appearance: Normal appearance. She is well-developed. She is not ill-appearing. HENT:      Head: Normocephalic and atraumatic. Eyes:      General:         Right eye: No discharge. Left eye: No discharge. Extraocular Movements: Extraocular movements intact. Conjunctiva/sclera: Conjunctivae normal.   Neck:      Thyroid: No thyromegaly. Vascular: No carotid bruit. Cardiovascular:      Rate and Rhythm: Normal rate and regular rhythm. Heart sounds: Normal heart sounds.  No murmur heard.  Pulmonary:      Effort: Pulmonary effort is normal. No respiratory distress. Breath sounds: Normal breath sounds. Abdominal:      General: There is no distension. Palpations: Abdomen is soft. Tenderness: There is no abdominal tenderness. Musculoskeletal:      Cervical back: Neck supple. Right lower leg: No edema. Left lower leg: No edema. Lymphadenopathy:      Cervical: No cervical adenopathy. Skin:     Findings: No erythema or rash. Neurological:      General: No focal deficit present. Mental Status: She is alert and oriented to person, place, and time. Psychiatric:         Mood and Affect: Mood normal.         Behavior: Behavior normal.       Vitals:  /86   Pulse 70   Ht 4' 11\" (1.499 m)   Wt 167 lb (75.8 kg)   SpO2 96%   BMI 33.73 kg/m²       Data:     Lab Results   Component Value Date/Time     04/16/2019 03:30 PM    K 3.9 04/16/2019 03:30 PM     04/16/2019 03:30 PM    CO2 18 04/16/2019 03:30 PM    BUN 18 04/16/2019 03:30 PM    CREATININE 0.70 04/16/2019 03:30 PM    GLUCOSE 108 03/02/2022 09:04 AM    PROT 8.3 04/16/2019 03:30 PM    LABALBU 4.9 04/16/2019 03:30 PM    BILITOT 0.68 04/16/2019 03:30 PM    ALKPHOS 100 04/16/2019 03:30 PM    AST 22 04/16/2019 03:30 PM    ALT 28 04/16/2019 03:30 PM     Lab Results   Component Value Date/Time    WBC 5.6 03/02/2022 09:04 AM    RBC 4.35 03/02/2022 09:04 AM    HGB 13.2 03/02/2022 09:04 AM    HCT 38.6 03/02/2022 09:04 AM    MCV 88.7 03/02/2022 09:04 AM    MCH 30.3 03/02/2022 09:04 AM    MCHC 34.2 03/02/2022 09:04 AM    RDW 13.4 03/02/2022 09:04 AM     03/02/2022 09:04 AM    MPV NOT REPORTED 04/16/2019 03:30 PM     Lab Results   Component Value Date/Time    TSH 5.40 03/02/2022 09:04 AM     Lab Results   Component Value Date/Time    CHOL 231 03/02/2022 09:04 AM    HDL 44 03/02/2022 09:04 AM          Assessment/Plan:        1.  Migraine without status migrainosus, not intractable, unspecified migraine type  Pt to have relpax to use as needed. Pt has tried Nurtec but can't affort the price. 2. Anxiety  Stable on lexapro     3. Acquired hypothyroidism  Pt does show hypothyroid and so start synthroid 25mcg one daily and thierry in 6-8wks the labsx  - T4, Free; Future  - TSH; Future    4. Encounter for screening mammogram for malignant neoplasm of breast  Ordered screening   - [facility]GELA DIGITAL SCREEN BILATERAL; Future      Sadie received counseling on the following healthy behaviors: nutrition and exercise  Reviewed prior labs and health maintenance  Continue current medications, diet and exercise. Discussed use, benefit, and side effects of prescribed medications. Barriers to medication compliance addressed. Patient given educational materials - see patient instructions  Was a self-tracking handout given in paper form or via [employer]? Yes    Requested Prescriptions     Signed Prescriptions Disp Refills    eletriptan (RELPAX) 40 MG tablet 12 tablet 5     Sig: Take 1 tablet by mouth once as needed (migraine) may repeat in 2 hours if necessary (no more than 2 pills in 24 hours)    levothyroxine (SYNTHROID) 25 MCG tablet 30 tablet 2     Sig: Take 1 tablet by mouth daily       All patient questions answered. Patient voiced understanding. Quality Measures    Body mass index is 33.73 kg/m². Elevated. Weight control planned discussed Healthy diet and regular exercise. BP: 138/86 Blood pressure is Normal. Treatment plan consists of No treatment change needed.     Lab Results   Component Value Date    LDLCHOLESTEROL 167 (H) 03/02/2022    (goal LDL reduction with dx if diabetes is 50% LDL reduction)      PHQ Scores 11/21/2022 7/9/2021 7/1/2020 5/1/2019 4/25/2017   PHQ2 Score 0 0 2 0 0   PHQ9 Score 0 0 2 0 0     Interpretation of Total Score Depression Severity: 1-4 = Minimal depression, 5-9 = Mild depression, 10-14 = Moderate depression, 15-19 = Moderately severe depression, 20-27 = Severe depression      Return in about 6 months (around 5/21/2023) for 6mos ck up.       Electronically signed by Billy Jo MD on 11/21/2022 at 5:07 PM

## 2023-01-17 DIAGNOSIS — F41.9 ANXIETY: ICD-10-CM

## 2023-01-17 DIAGNOSIS — F41.0 PANIC ATTACKS: ICD-10-CM

## 2023-01-17 DIAGNOSIS — F32.9 REACTIVE DEPRESSION: ICD-10-CM

## 2023-01-17 DIAGNOSIS — G43.109 MIGRAINE WITH AURA AND WITHOUT STATUS MIGRAINOSUS, NOT INTRACTABLE: ICD-10-CM

## 2023-01-17 RX ORDER — TIZANIDINE 4 MG/1
TABLET ORAL
Qty: 180 TABLET | Refills: 1 | Status: SHIPPED | OUTPATIENT
Start: 2023-01-17

## 2023-01-17 RX ORDER — ESCITALOPRAM OXALATE 20 MG/1
TABLET ORAL
Qty: 90 TABLET | Refills: 1 | Status: SHIPPED | OUTPATIENT
Start: 2023-01-17

## 2023-01-17 NOTE — TELEPHONE ENCOUNTER
Last OV: 11/21/2022   chronic   Last RX:    Next scheduled apt: Visit date not found            Surescript requesting a refill

## 2023-02-21 NOTE — TELEPHONE ENCOUNTER
-- DO NOT REPLY / DO NOT REPLY ALL --  -- Message is from [facility] [facility] [employer] ([facility]) --    General Patient Message: Would like to have referral issued for kidney Dr at [facility] [phone]     Caller Information       Type Contact Phone/Fax    02/21/2023 11:07 AM CST Phone (Incoming) Jo-Ann Koroma (Self) [phone] (M)        Alternative phone number: none    Can a detailed message be left? Yes    Message Turnaround:     Is it Working Hours? Yes - Working Hours     [place]:    Please give this turnaround time to the caller:   \"This message will be sent to [state Provider's name]. The clinical team will fulfill your request as soon as they review your message.\"                 Patient is asking for a refill on tizanidine 4 mg and Lexapro 20 mg. Patient last seen 11/21/22. No future appt found. Drug [address] Maintenance   Topic Date Due    HIV screen  Never done    Hepatitis C screen  Never done    Diabetes screen  Never done    Shingles vaccine (1 of 2) Never done    DTaP/Tdap/Td vaccine (2 - Td or Tdap) 02/20/2018    COVID-19 Vaccine (4 - Booster for Angelo Files series) 01/26/2022    Breast cancer screen  10/08/2022    Colorectal Cancer Screen  07/08/2023    Depression Monitoring  11/21/2023    Lipids  03/02/2027    Flu vaccine  Completed    Hepatitis A vaccine  Aged Out    Hib vaccine  Aged Out    Meningococcal (ACWY) vaccine  Aged Out    Pneumococcal 0-64 years Vaccine  Aged Out             (applicable per patient's age: Cancer Screenings, Depression Screening, Fall Risk Screening, Immunizations)    LDL Cholesterol (mg/dL)   Date Value   03/02/2022 167 (H)     AST (U/L)   Date Value   04/16/2019 22     ALT (U/L)   Date Value   04/16/2019 28     BUN (mg/dL)   Date Value   04/16/2019 18      (goal A1C is < 7)   (goal LDL is <100) need 30-50% reduction from baseline     BP Readings from Last 3 Encounters:   11/21/22 138/86   02/23/22 130/80   12/09/21 126/80    (goal /80)      All Future Testing planned in [facility]:  Lab Frequency Next Occurrence   TSH Once 06/02/2022   T4, Free Once 02/21/2023   TSH Once 02/21/2023   [facility]A DIGITAL SCREEN BILATERAL Once 11/22/2022       Next Visit Date:  No future appointments.          Patient Active Problem List:     Anemia     Panic attacks     Anxiety     Radiculopathy, cervical region     Paresthesia and pain of both upper extremities     Arthropathy of cervical facet joint     Migraine without status migrainosus, not intractable     Mixed hyperlipidemia     Precordial pain

## 2023-05-09 ENCOUNTER — HOSPITAL ENCOUNTER (OUTPATIENT)
Age: 58
Discharge: HOME | End: 2023-05-09
Payer: COMMERCIAL

## 2023-05-09 ENCOUNTER — OFFICE VISIT (OUTPATIENT)
Dept: FAMILY MEDICINE CLINIC | Age: 58
End: 2023-05-09
Payer: COMMERCIAL

## 2023-05-09 VITALS
HEART RATE: 75 BPM | DIASTOLIC BLOOD PRESSURE: 84 MMHG | OXYGEN SATURATION: 95 % | SYSTOLIC BLOOD PRESSURE: 132 MMHG | WEIGHT: 172 LBS | HEIGHT: 59 IN | BODY MASS INDEX: 34.68 KG/M2

## 2023-05-09 DIAGNOSIS — J01.40 ACUTE NON-RECURRENT PANSINUSITIS: Primary | ICD-10-CM

## 2023-05-09 DIAGNOSIS — E03.9 ACQUIRED HYPOTHYROIDISM: ICD-10-CM

## 2023-05-09 LAB
T4 FREE SERPL-MCNC: 0.7 NG/DL (ref 0.9–1.7)
TSH SERPL-ACNC: 10.21 UIU/ML (ref 0.3–5)

## 2023-05-09 PROCEDURE — 84443 ASSAY THYROID STIM HORMONE: CPT

## 2023-05-09 PROCEDURE — 99213 OFFICE O/P EST LOW 20 MIN: CPT | Performed by: FAMILY MEDICINE

## 2023-05-09 PROCEDURE — G8417 CALC BMI ABV UP PARAM F/U: HCPCS | Performed by: FAMILY MEDICINE

## 2023-05-09 PROCEDURE — 1036F TOBACCO NON-USER: CPT | Performed by: FAMILY MEDICINE

## 2023-05-09 PROCEDURE — 84439 ASSAY OF FREE THYROXINE: CPT

## 2023-05-09 PROCEDURE — 36415 COLL VENOUS BLD VENIPUNCTURE: CPT

## 2023-05-09 PROCEDURE — 3017F COLORECTAL CA SCREEN DOC REV: CPT | Performed by: FAMILY MEDICINE

## 2023-05-09 PROCEDURE — G8427 DOCREV CUR MEDS BY ELIG CLIN: HCPCS | Performed by: FAMILY MEDICINE

## 2023-05-09 RX ORDER — DOXYCYCLINE HYCLATE 100 MG
100 TABLET ORAL 2 TIMES DAILY
Qty: 20 TABLET | Refills: 0 | Status: SHIPPED | OUTPATIENT
Start: 2023-05-09 | End: 2023-05-19

## 2023-05-09 SDOH — ECONOMIC STABILITY: INCOME INSECURITY: HOW HARD IS IT FOR YOU TO PAY FOR THE VERY BASICS LIKE FOOD, HOUSING, MEDICAL CARE, AND HEATING?: NOT HARD AT ALL

## 2023-05-09 SDOH — ECONOMIC STABILITY: HOUSING INSECURITY
IN THE LAST 12 MONTHS, WAS THERE A TIME WHEN YOU DID NOT HAVE A STEADY PLACE TO SLEEP OR SLEPT IN A SHELTER (INCLUDING NOW)?: NO

## 2023-05-09 SDOH — ECONOMIC STABILITY: FOOD INSECURITY: WITHIN THE PAST 12 MONTHS, YOU WORRIED THAT YOUR FOOD WOULD RUN OUT BEFORE YOU GOT MONEY TO BUY MORE.: NEVER TRUE

## 2023-05-09 SDOH — ECONOMIC STABILITY: FOOD INSECURITY: WITHIN THE PAST 12 MONTHS, THE FOOD YOU BOUGHT JUST DIDN'T LAST AND YOU DIDN'T HAVE MONEY TO GET MORE.: NEVER TRUE

## 2023-05-09 ASSESSMENT — PATIENT HEALTH QUESTIONNAIRE - PHQ9
10. IF YOU CHECKED OFF ANY PROBLEMS, HOW DIFFICULT HAVE THESE PROBLEMS MADE IT FOR YOU TO DO YOUR WORK, TAKE CARE OF THINGS AT HOME, OR GET ALONG WITH OTHER PEOPLE: 0
5. POOR APPETITE OR OVEREATING: 0
3. TROUBLE FALLING OR STAYING ASLEEP: 0
7. TROUBLE CONCENTRATING ON THINGS, SUCH AS READING THE NEWSPAPER OR WATCHING TELEVISION: 0
1. LITTLE INTEREST OR PLEASURE IN DOING THINGS: 0
4. FEELING TIRED OR HAVING LITTLE ENERGY: 0
2. FEELING DOWN, DEPRESSED OR HOPELESS: 0
SUM OF ALL RESPONSES TO PHQ QUESTIONS 1-9: 0
SUM OF ALL RESPONSES TO PHQ QUESTIONS 1-9: 0
9. THOUGHTS THAT YOU WOULD BE BETTER OFF DEAD, OR OF HURTING YOURSELF: 0
6. FEELING BAD ABOUT YOURSELF - OR THAT YOU ARE A FAILURE OR HAVE LET YOURSELF OR YOUR FAMILY DOWN: 0
SUM OF ALL RESPONSES TO PHQ QUESTIONS 1-9: 0
8. MOVING OR SPEAKING SO SLOWLY THAT OTHER PEOPLE COULD HAVE NOTICED. OR THE OPPOSITE, BEING SO FIGETY OR RESTLESS THAT YOU HAVE BEEN MOVING AROUND A LOT MORE THAN USUAL: 0
SUM OF ALL RESPONSES TO PHQ9 QUESTIONS 1 & 2: 0
SUM OF ALL RESPONSES TO PHQ QUESTIONS 1-9: 0

## 2023-05-09 ASSESSMENT — ENCOUNTER SYMPTOMS
EYE ITCHING: 1
SORE THROAT: 0
COUGH: 1
SINUS PRESSURE: 1
SINUS COMPLAINT: 1

## 2023-05-09 NOTE — PATIENT INSTRUCTIONS
[employer] SURVEY:    You may be receiving a survey from [email or web address] regarding your visit today. You may get this in the mail, through your MyChart or in your email. Please complete the survey to enable us to provide the highest quality of care to you and your family. If you cannot score us as very good ( 5 Stars) on any question, please feel free to call the office to discuss how we could have made your experience exceptional.     Thank you.     Clinical Care Team:   MD Joyce Salinas, [facility]                                     Triage: Seferino Dillon, [address] Team:  Zaid Graham Pals

## 2023-05-09 NOTE — PROGRESS NOTES
HPI Notes    Name: Arcadio Becker  : 1965        Chief Complaint:     Chief Complaint   Patient presents with    Allergies     Pt states that she is have a lot of construction done at her house. Pt has been taking zyrtec. Sinus Problem     Pt states that she is having  a lot of facial pain/pressure x1 week    Cough     Pt also has a cough x1 week       History of Present Illness:     Arcadio Becker is a 63 y.o.  female who presents with Allergies (Pt states that she is have a lot of construction done at her house. Pt has been taking zyrtec.), Sinus Problem (Pt states that she is having  a lot of facial pain/pressure x1 week), and Cough (Pt also has a cough x1 week)      Allergies  Presents for follow-up visit. She complains of cough, eye itching, headaches and sinus pressure. She reports no congestion, fever, plugged ear sensation, rash or sore throat. (pt was having some ear popping) The problem occurs daily. Current severity: gradually getting worse -- more than just the allergy symptoms. Allergens exposed to: pt has seasonal allergies but pt started last week getting worse with cough and sinus pressure. Compliance with medications is % (pt is taking zyrtec and using flonase nasal spray too.). There are no compliance problems. Sinus Problem  This is a new (pt had a NEGATIVE COVID test) problem. Episode onset: 1wk. The problem has been gradually worsening since onset. There has been no fever. Associated symptoms include coughing, headaches and sinus pressure. Pertinent negatives include no congestion or sore throat. Treatments tried: zyrtec and mucinex. Cough  This is a new problem. The current episode started in the past 7 days. The problem has been unchanged. The cough is Non-productive. Associated symptoms include headaches. Pertinent negatives include no fever, rash or sore throat.      Past Medical History:     Past Medical History:   Diagnosis Date    Anemia as a teen    Cerebral artery

## 2023-05-10 ENCOUNTER — TELEPHONE (OUTPATIENT)
Dept: FAMILY MEDICINE CLINIC | Age: 58
End: 2023-05-10

## 2023-05-10 DIAGNOSIS — E03.9 ACQUIRED HYPOTHYROIDISM: Primary | ICD-10-CM

## 2023-05-10 RX ORDER — LEVOTHYROXINE SODIUM 0.05 MG/1
50 TABLET ORAL DAILY
Qty: 30 TABLET | Refills: 5 | Status: SHIPPED | OUTPATIENT
Start: 2023-05-10

## 2023-05-10 NOTE — TELEPHONE ENCOUNTER
----- Message from Lady Nicol MD sent at 5/10/2023  1:41 PM EDT -----  Tell pt her TSH is now up from 5 to 10 and the free T4 is Lower so she is still with LOW thyroid. So she needs to now go on the SYNTHROID 50mcg and NOT the 25mcg.   Please pend the synthroid 50mcg #30 and 5 and thierry TSH and free T4 in SIX weeks After taking the synthroid today (she stopped taking the synthroid 25mcg)

## 2023-06-07 ENCOUNTER — HOSPITAL ENCOUNTER (EMERGENCY)
Age: 58
Discharge: HOME | End: 2023-06-07
Attending: FAMILY MEDICINE
Payer: COMMERCIAL

## 2023-06-07 VITALS
OXYGEN SATURATION: 96 % | RESPIRATION RATE: 18 BRPM | SYSTOLIC BLOOD PRESSURE: 191 MMHG | TEMPERATURE: 97.7 F | HEART RATE: 81 BPM | DIASTOLIC BLOOD PRESSURE: 88 MMHG

## 2023-06-07 DIAGNOSIS — G43.101 MIGRAINE WITH AURA AND WITH STATUS MIGRAINOSUS, NOT INTRACTABLE: Primary | ICD-10-CM

## 2023-06-07 PROCEDURE — 6360000002 HC RX W HCPCS: Performed by: FAMILY MEDICINE

## 2023-06-07 RX ORDER — BUTALBITAL, ACETAMINOPHEN AND CAFFEINE 300; 40; 50 MG/1; MG/1; MG/1
1 CAPSULE ORAL EVERY 4 HOURS PRN
Qty: 20 CAPSULE | Refills: 0 | Status: SHIPPED | OUTPATIENT
Start: 2023-06-07

## 2023-06-07 RX ORDER — DEXAMETHASONE SODIUM PHOSPHATE 10 MG/ML
10 INJECTION, SOLUTION INTRAMUSCULAR; INTRAVENOUS ONCE
Status: COMPLETED | OUTPATIENT
Start: 2023-06-07 | End: 2023-06-07

## 2023-06-07 RX ORDER — DIPHENHYDRAMINE HYDROCHLORIDE 50 MG/ML
25 INJECTION INTRAMUSCULAR; INTRAVENOUS ONCE
Status: COMPLETED | OUTPATIENT
Start: 2023-06-07 | End: 2023-06-07

## 2023-06-07 RX ORDER — KETOROLAC TROMETHAMINE 30 MG/ML
30 INJECTION, SOLUTION INTRAMUSCULAR; INTRAVENOUS ONCE
Status: COMPLETED | OUTPATIENT
Start: 2023-06-07 | End: 2023-06-07

## 2023-06-07 RX ORDER — METOCLOPRAMIDE HYDROCHLORIDE 5 MG/ML
10 INJECTION INTRAMUSCULAR; INTRAVENOUS ONCE
Status: COMPLETED | OUTPATIENT
Start: 2023-06-07 | End: 2023-06-07

## 2023-06-07 RX ADMIN — DIPHENHYDRAMINE HYDROCHLORIDE 25 MG: 50 INJECTION, SOLUTION INTRAMUSCULAR; INTRAVENOUS at 07:57

## 2023-06-07 RX ADMIN — METOCLOPRAMIDE 10 MG: 5 INJECTION, SOLUTION INTRAMUSCULAR; INTRAVENOUS at 07:56

## 2023-06-07 RX ADMIN — DEXAMETHASONE SODIUM PHOSPHATE 10 MG: 10 INJECTION, SOLUTION INTRAMUSCULAR; INTRAVENOUS at 07:57

## 2023-06-07 RX ADMIN — KETOROLAC TROMETHAMINE 30 MG: 30 INJECTION, SOLUTION INTRAMUSCULAR; INTRAVENOUS at 07:57

## 2023-06-07 ASSESSMENT — ENCOUNTER SYMPTOMS
VOMITING: 1
PHOTOPHOBIA: 1
NAUSEA: 1

## 2023-06-07 ASSESSMENT — PAIN DESCRIPTION - LOCATION: LOCATION: HEAD

## 2023-06-07 ASSESSMENT — PAIN - FUNCTIONAL ASSESSMENT
PAIN_FUNCTIONAL_ASSESSMENT: 0-10
PAIN_FUNCTIONAL_ASSESSMENT: PREVENTS OR INTERFERES SOME ACTIVE ACTIVITIES AND ADLS

## 2023-06-07 ASSESSMENT — PAIN DESCRIPTION - PAIN TYPE: TYPE: ACUTE PAIN

## 2023-06-07 ASSESSMENT — PAIN SCALES - GENERAL
PAINLEVEL_OUTOF10: 5
PAINLEVEL_OUTOF10: 10

## 2023-06-07 ASSESSMENT — PAIN DESCRIPTION - DESCRIPTORS: DESCRIPTORS: ACHING;TIGHTNESS;THROBBING

## 2023-06-07 NOTE — ED PROVIDER NOTES
[address]      Pt Name: Angélica Wilder  MRN: [id number]  [place] 1965  Date of evaluation: 6/7/2023  Provider: Inga Gardiner MD    CHIEF COMPLAINT       Chief Complaint   Patient presents with    Migraine     Migraine x 3 days. History of migraines, her meds usually help her but not helping this time. Also c/o nausea and light sensitivity          HISTORY OF PRESENT ILLNESS      Angélica Wilder is a 63 y.o. female who presents to the emergency department via private vehicle, patient planing of migraine type headache, onset over the past 2 to 3 days, currently rates 11 of 10, states yesterday was actually little worse, very slightly different from her typical as across her entire head, has had some nausea with associated vomiting, will be in phonophobia, she does have a known history of migraines, has tried Relpax around-the-clock as well as ibuprofen without relief. Patient no longer takes Nurtec due to cost.  Denies trauma falls or any striking of the head. REVIEW OF SYSTEMS       Review of Systems   Eyes:  Positive for photophobia. Gastrointestinal:  Positive for nausea and vomiting. Neurological:  Positive for headaches. All other systems reviewed and are negative.       PAST MEDICAL HISTORY     Past Medical History:   Diagnosis Date    Anemia as a teen    Cerebral artery occlusion with cerebral infarction ([facility] [address]     \"mini stroke\"    Crohn's     Headache, classical migraine     migraines    Hyperlipidemia     Nausea & vomiting     Problems with head, neck, and trunk          SURGICAL HISTORY       Past Surgical History:   Procedure Laterality Date    BACK SURGERY      cervical spine injections    BLADDER SURGERY  1972    CARPAL TUNNEL RELEASE Right     right dequervains also    CERVICAL SPINE SURGERY  2004    HYSTERECTOMY, TOTAL ABDOMINAL (CERVIX REMOVED)  2001    OVARY REMOVAL  2008    SHOULDER SURGERY  2003         CURRENT MEDICATIONS       Current

## 2023-06-15 ENCOUNTER — APPOINTMENT (OUTPATIENT)
Dept: CT IMAGING | Age: 58
End: 2023-06-15
Payer: COMMERCIAL

## 2023-06-15 ENCOUNTER — HOSPITAL ENCOUNTER (EMERGENCY)
Age: 58
Discharge: HOME | End: 2023-06-15
Attending: EMERGENCY MEDICINE
Payer: COMMERCIAL

## 2023-06-15 VITALS
BODY MASS INDEX: 33.81 KG/M2 | SYSTOLIC BLOOD PRESSURE: 170 MMHG | HEART RATE: 83 BPM | DIASTOLIC BLOOD PRESSURE: 88 MMHG | WEIGHT: 167.7 LBS | HEIGHT: 59 IN | RESPIRATION RATE: 20 BRPM | OXYGEN SATURATION: 93 % | TEMPERATURE: 99.4 F

## 2023-06-15 DIAGNOSIS — I10 ESSENTIAL HYPERTENSION: ICD-10-CM

## 2023-06-15 DIAGNOSIS — G43.809 OTHER MIGRAINE WITHOUT STATUS MIGRAINOSUS, NOT INTRACTABLE: Primary | ICD-10-CM

## 2023-06-15 LAB
ALBUMIN SERPL-MCNC: 4.6 G/DL (ref 3.5–5.2)
ALP SERPL-CCNC: 102 U/L (ref 35–104)
ALT SERPL-CCNC: 22 U/L (ref 5–33)
ANION GAP SERPL CALCULATED.3IONS-SCNC: 14 MMOL/L (ref 9–17)
AST SERPL-CCNC: 17 U/L
BASOPHILS # BLD: 0.1 K/UL (ref 0–0.2)
BASOPHILS NFR BLD: 1 % (ref 0–2)
BILIRUB SERPL-MCNC: 0.4 MG/DL (ref 0.3–1.2)
BUN SERPL-MCNC: 20 MG/DL (ref 6–20)
BUN/CREAT SERPL: 26 (ref 9–20)
CALCIUM SERPL-MCNC: 10.4 MG/DL (ref 8.6–10.4)
CHLORIDE SERPL-SCNC: 95 MMOL/L (ref 98–107)
CO2 SERPL-SCNC: 26 MMOL/L (ref 20–31)
CREAT SERPL-MCNC: 0.78 MG/DL (ref 0.5–0.9)
DIFFERENTIAL TYPE: YES
EOSINOPHIL # BLD: 0.2 K/UL (ref 0–0.4)
EOSINOPHILS RELATIVE PERCENT: 2 % (ref 0–5)
ERYTHROCYTE [DISTWIDTH] IN BLOOD BY AUTOMATED COUNT: 13.6 % (ref 12.1–15.2)
GFR SERPL CREATININE-BSD FRML MDRD: >60 ML/MIN/1.73M2
GLUCOSE SERPL-MCNC: 114 MG/DL (ref 70–99)
HCT VFR BLD AUTO: 45 % (ref 36–46)
HGB BLD-MCNC: 15 G/DL (ref 12–16)
LYMPHOCYTES # BLD: 29 % (ref 15–40)
LYMPHOCYTES NFR BLD: 2.7 K/UL (ref 1–4.8)
MCH RBC QN AUTO: 29.8 PG (ref 26–34)
MCHC RBC AUTO-ENTMCNC: 33.4 G/DL (ref 31–37)
MCV RBC AUTO: 89.3 FL (ref 80–100)
MONOCYTES NFR BLD: 0.9 K/UL (ref 0–1)
MONOCYTES NFR BLD: 9 % (ref 4–8)
NEUTROPHILS NFR BLD: 59 % (ref 47–75)
NEUTS SEG NFR BLD: 5.5 K/UL (ref 2.5–7)
PLATELET # BLD AUTO: 303 K/UL (ref 140–450)
POTASSIUM SERPL-SCNC: 3.4 MMOL/L (ref 3.7–5.3)
PROT SERPL-MCNC: 8.2 G/DL (ref 6.4–8.3)
RBC # BLD AUTO: 5.05 M/UL (ref 4–5.2)
SODIUM SERPL-SCNC: 135 MMOL/L (ref 135–144)
TROPONIN I SERPL HS-MCNC: <6 NG/L (ref 0–14)
WBC OTHER # BLD: 9.2 K/UL (ref 3.5–11)

## 2023-06-15 PROCEDURE — 85027 COMPLETE CBC AUTOMATED: CPT

## 2023-06-15 PROCEDURE — 84484 ASSAY OF TROPONIN QUANT: CPT

## 2023-06-15 PROCEDURE — 96375 TX/PRO/DX INJ NEW DRUG ADDON: CPT

## 2023-06-15 PROCEDURE — 80053 COMPREHEN METABOLIC PANEL: CPT

## 2023-06-15 PROCEDURE — 99284 EMERGENCY DEPT VISIT MOD MDM: CPT

## 2023-06-15 PROCEDURE — 93005 ELECTROCARDIOGRAM TRACING: CPT | Performed by: EMERGENCY MEDICINE

## 2023-06-15 PROCEDURE — 6360000002 HC RX W HCPCS: Performed by: EMERGENCY MEDICINE

## 2023-06-15 PROCEDURE — 6370000000 HC RX 637 (ALT 250 FOR IP): Performed by: EMERGENCY MEDICINE

## 2023-06-15 PROCEDURE — 70450 CT HEAD/BRAIN W/O DYE: CPT

## 2023-06-15 PROCEDURE — 96374 THER/PROPH/DIAG INJ IV PUSH: CPT

## 2023-06-15 RX ORDER — KETOROLAC TROMETHAMINE 15 MG/ML
15 INJECTION, SOLUTION INTRAMUSCULAR; INTRAVENOUS ONCE
Status: COMPLETED | OUTPATIENT
Start: 2023-06-15 | End: 2023-06-15

## 2023-06-15 RX ORDER — CLONIDINE HYDROCHLORIDE 0.1 MG/1
0.1 TABLET ORAL ONCE
Status: COMPLETED | OUTPATIENT
Start: 2023-06-15 | End: 2023-06-15

## 2023-06-15 RX ORDER — PROCHLORPERAZINE EDISYLATE 5 MG/ML
5 INJECTION INTRAMUSCULAR; INTRAVENOUS ONCE
Status: COMPLETED | OUTPATIENT
Start: 2023-06-15 | End: 2023-06-15

## 2023-06-15 RX ADMIN — PROCHLORPERAZINE EDISYLATE 5 MG: 5 INJECTION INTRAMUSCULAR; INTRAVENOUS at 18:00

## 2023-06-15 RX ADMIN — KETOROLAC TROMETHAMINE 15 MG: 15 INJECTION, SOLUTION INTRAMUSCULAR; INTRAVENOUS at 18:00

## 2023-06-15 RX ADMIN — CLONIDINE HYDROCHLORIDE 0.1 MG: 0.1 TABLET ORAL at 18:17

## 2023-06-15 ASSESSMENT — PAIN - FUNCTIONAL ASSESSMENT
PAIN_FUNCTIONAL_ASSESSMENT: ACTIVITIES ARE NOT PREVENTED
PAIN_FUNCTIONAL_ASSESSMENT: ACTIVITIES ARE NOT PREVENTED
PAIN_FUNCTIONAL_ASSESSMENT: 0-10

## 2023-06-15 ASSESSMENT — PAIN DESCRIPTION - ORIENTATION
ORIENTATION: ANTERIOR
ORIENTATION: ANTERIOR

## 2023-06-15 ASSESSMENT — LIFESTYLE VARIABLES
HOW MANY STANDARD DRINKS CONTAINING ALCOHOL DO YOU HAVE ON A TYPICAL DAY: PATIENT DOES NOT DRINK
HOW OFTEN DO YOU HAVE A DRINK CONTAINING ALCOHOL: NEVER

## 2023-06-15 ASSESSMENT — PAIN SCALES - GENERAL
PAINLEVEL_OUTOF10: 3
PAINLEVEL_OUTOF10: 6

## 2023-06-15 ASSESSMENT — PAIN DESCRIPTION - PAIN TYPE
TYPE: ACUTE PAIN
TYPE: ACUTE PAIN

## 2023-06-15 ASSESSMENT — PAIN DESCRIPTION - DESCRIPTORS
DESCRIPTORS: DULL
DESCRIPTORS: DULL

## 2023-06-15 ASSESSMENT — PAIN DESCRIPTION - LOCATION
LOCATION: HEAD
LOCATION: HEAD

## 2023-06-15 ASSESSMENT — PAIN DESCRIPTION - FREQUENCY
FREQUENCY: CONTINUOUS
FREQUENCY: CONTINUOUS

## 2023-06-15 NOTE — ED PROVIDER NOTES
this visit:    New Prescriptions    No medications on file       Follow-up:  Nelly Phillips MD  [address] [facility] [address]  [phone]    Schedule an appointment as soon as possible for a visit in 1 week          Final Impression:   1. Other migraine without status migrainosus, not intractable Stable   2.  Essential hypertension New Problem                                                   Mikayla Hudson MD  06/15/23 2001

## 2023-06-16 LAB
EKG ATRIAL RATE: 95 BPM
EKG P AXIS: 45 DEGREES
EKG P-R INTERVAL: 154 MS
EKG Q-T INTERVAL: 332 MS
EKG QRS DURATION: 74 MS
EKG QTC CALCULATION (BAZETT): 417 MS
EKG R AXIS: 20 DEGREES
EKG T AXIS: 60 DEGREES
EKG VENTRICULAR RATE: 95 BPM

## 2023-06-16 PROCEDURE — 93010 ELECTROCARDIOGRAM REPORT: CPT | Performed by: INTERNAL MEDICINE

## 2023-06-19 ENCOUNTER — HOSPITAL ENCOUNTER (OUTPATIENT)
Age: 58
Discharge: HOME | End: 2023-06-19
Payer: COMMERCIAL

## 2023-06-19 DIAGNOSIS — G43.109 MIGRAINE WITH AURA AND WITHOUT STATUS MIGRAINOSUS, NOT INTRACTABLE: ICD-10-CM

## 2023-06-19 DIAGNOSIS — I16.0 HYPERTENSIVE URGENCY: ICD-10-CM

## 2023-06-19 DIAGNOSIS — E03.9 ACQUIRED HYPOTHYROIDISM: ICD-10-CM

## 2023-06-19 LAB
ALBUMIN SERPL-MCNC: 4.2 G/DL (ref 3.5–5.2)
ALP SERPL-CCNC: 90 U/L (ref 35–104)
ALT SERPL-CCNC: 19 U/L (ref 5–33)
ANION GAP SERPL CALCULATED.3IONS-SCNC: 11 MMOL/L (ref 9–17)
AST SERPL-CCNC: 17 U/L
BASOPHILS # BLD: 0 K/UL (ref 0–0.2)
BASOPHILS NFR BLD: 1 % (ref 0–2)
BILIRUB SERPL-MCNC: 0.4 MG/DL (ref 0.3–1.2)
BUN SERPL-MCNC: 15 MG/DL (ref 6–20)
BUN/CREAT SERPL: 20 (ref 9–20)
CALCIUM SERPL-MCNC: 9 MG/DL (ref 8.6–10.4)
CHLORIDE SERPL-SCNC: 98 MMOL/L (ref 98–107)
CO2 SERPL-SCNC: 26 MMOL/L (ref 20–31)
CREAT SERPL-MCNC: 0.75 MG/DL (ref 0.5–0.9)
DIFFERENTIAL TYPE: YES
EOSINOPHIL # BLD: 0.1 K/UL (ref 0–0.4)
EOSINOPHILS RELATIVE PERCENT: 2 % (ref 0–5)
ERYTHROCYTE [DISTWIDTH] IN BLOOD BY AUTOMATED COUNT: 13.9 % (ref 12.1–15.2)
GFR SERPL CREATININE-BSD FRML MDRD: >60 ML/MIN/1.73M2
GLUCOSE SERPL-MCNC: 108 MG/DL (ref 70–99)
HCT VFR BLD AUTO: 40.5 % (ref 36–46)
HGB BLD-MCNC: 13.7 G/DL (ref 12–16)
LYMPHOCYTES # BLD: 31 % (ref 15–40)
LYMPHOCYTES NFR BLD: 1.9 K/UL (ref 1–4.8)
MCH RBC QN AUTO: 30.1 PG (ref 26–34)
MCHC RBC AUTO-ENTMCNC: 33.7 G/DL (ref 31–37)
MCV RBC AUTO: 89.3 FL (ref 80–100)
MONOCYTES NFR BLD: 0.5 K/UL (ref 0–1)
MONOCYTES NFR BLD: 8 % (ref 4–8)
NEUTROPHILS NFR BLD: 58 % (ref 47–75)
NEUTS SEG NFR BLD: 3.6 K/UL (ref 2.5–7)
PLATELET # BLD AUTO: 274 K/UL (ref 140–450)
POTASSIUM SERPL-SCNC: 3.6 MMOL/L (ref 3.7–5.3)
PROT SERPL-MCNC: 7.3 G/DL (ref 6.4–8.3)
RBC # BLD AUTO: 4.54 M/UL (ref 4–5.2)
SODIUM SERPL-SCNC: 135 MMOL/L (ref 135–144)
T4 FREE SERPL-MCNC: 1.2 NG/DL (ref 0.9–1.7)
TSH SERPL-MCNC: 4.28 UIU/ML (ref 0.3–5)
WBC OTHER # BLD: 6.2 K/UL (ref 3.5–11)

## 2023-06-19 PROCEDURE — 82384 ASSAY THREE CATECHOLAMINES: CPT

## 2023-06-19 PROCEDURE — 80053 COMPREHEN METABOLIC PANEL: CPT

## 2023-06-19 PROCEDURE — 84439 ASSAY OF FREE THYROXINE: CPT

## 2023-06-19 PROCEDURE — 85027 COMPLETE CBC AUTOMATED: CPT

## 2023-06-19 PROCEDURE — 83835 ASSAY OF METANEPHRINES: CPT

## 2023-06-19 PROCEDURE — 84443 ASSAY THYROID STIM HORMONE: CPT

## 2023-06-19 PROCEDURE — 36415 COLL VENOUS BLD VENIPUNCTURE: CPT

## 2023-06-21 ENCOUNTER — TELEPHONE (OUTPATIENT)
Dept: FAMILY MEDICINE CLINIC | Age: 58
End: 2023-06-21

## 2023-06-21 NOTE — TELEPHONE ENCOUNTER
Pt notified , states she went to the ED on the 15th due to her BP but has been good and bp are good at home no SE with new medications

## 2023-06-21 NOTE — TELEPHONE ENCOUNTER
LVM to call office regarding lab results, Dr Sharifa Fields recommendations, and f/u med change. Recommend high K foods  Dried fruits (raisins, apricots)  Beans, lentils. Potatoes. Squash (acorn, butternut)  Spinach, broccoli. Avocado. Bananas.

## 2023-06-21 NOTE — TELEPHONE ENCOUNTER
----- Message from Sheldon Mcmillan MD sent at 6/20/2023 10:32 PM EDT -----  Tell pt her glucose was 108, and all her kidney and liver test NORMAL  Her potassium a little low still but up from 3.4 to 3.6 and like 3.7 or above so more potassium rich foods in diet   Her thyroid test ALL normal   I don't have her URINE studies back and I am going out of town. Evie Irizarry is covering   BUT how is she feeling on the HCTZ and NORvasc and what have her BP readings been this past week? ?

## 2023-06-21 NOTE — TELEPHONE ENCOUNTER
----- Message from Shabana Sorto MD sent at 6/20/2023 10:32 PM EDT -----  Tell pt her glucose was 108, and all her kidney and liver test NORMAL  Her potassium a little low still but up from 3.4 to 3.6 and like 3.7 or above so more potassium rich foods in diet   Her thyroid test ALL normal   I don't have her URINE studies back and I am going out of town. Flor Smith is covering   BUT how is she feeling on the HCTZ and NORvasc and what have her BP readings been this past week? ?

## 2023-06-22 LAB
CATECHOL/URINE INTERP: NORMAL
COLLECT DURATION TIME SPEC: 24 H
COLLECT DURATION TIME SPEC: 24 H
CREAT 24H UR-MCNC: 73 MG/DL
CREAT 24H UR-MCNC: 76 MG/DL
CREATININE URINE /24 HR: 1314 MG/D (ref 500–1400)
CREATININE URINE /24 HR: 1368 MG/D (ref 500–1400)
DOPAMINE 24 HOUR URINE: 173 UG/D (ref 71–485)
DOPAMINE, (UG/L): 96 UG/L
DOPAMINE, UR/G CRT: 126 UG/G CRT (ref 0–250)
EPINEPHRINE 24 HOUR URINE: <2 UG/D (ref 1–14)
EPINEPHRINE, (UG/L): <1 UG/L
EPINEPHRINE, UR/G CRT: <1 UG/G CRT (ref 0–20)
METANEPHRINE UF INTERPRETATION: NORMAL
METANEPHRINE UG/G CRE: 30 UG/G CRT (ref 0–300)
METANEPHRINES 24 HOUR URINE: 40 UG/D (ref 36–229)
METANEPHRINES, URINE (UMOL/L): 22 UG/L
NOREPINEPHRINE 24 HOUR URINE: 49 UG/D (ref 14–120)
NOREPINEPHRINE URINE MCG/GCR: 36 UG/G CRT (ref 0–45)
NOREPINEPHRINE, (UG/L): 27 UG/L
NORMETANEPHRINE, (G/CRT): 329 UG/G CRT (ref 0–400)
NORMETANEPHRINE, (NMOL/DAY): 432 UG/D (ref 95–650)
NORMETANEPHRINES, NMOL/L: 240 UG/L
SPECIMEN VOL ?TM UR: 1800 ML
SPECIMEN VOL ?TM UR: 1800 ML

## 2023-06-22 RX ORDER — BUSPIRONE HYDROCHLORIDE 5 MG/1
5 TABLET ORAL 3 TIMES DAILY PRN
Qty: 90 TABLET | Refills: 0 | Status: SHIPPED | OUTPATIENT
Start: 2023-06-22 | End: 2023-07-22

## 2023-06-22 NOTE — TELEPHONE ENCOUNTER
Pt called stating she was seen in the ER on 06/07/23 in the office on 06/13/23 and in the ER again on 06/15/23. Pt stated new medications of Hydrochlorothiazide and amlodipine. For the last two weeks pt has had symptoms headache, nausea and elevated BP. BP running about 200/110. Pt stated her main concern is she is having anxiety and shakiness. Pt requesting a medication to help with her anxiety.  Please advise

## 2023-06-22 NOTE — TELEPHONE ENCOUNTER
Continue hydrochlorothiazide. Increase amlodipine to 10 mg daily. I will send in BuSpar 5 mg 3 times daily as needed anxiety.  Keep appt on 7/11 with Dr Stephanie Matias

## 2023-06-29 RX ORDER — AMLODIPINE BESYLATE 10 MG/1
10 TABLET ORAL DAILY
COMMUNITY
End: 2023-06-29 | Stop reason: SDUPTHER

## 2023-06-29 RX ORDER — AMLODIPINE BESYLATE 5 MG/1
10 TABLET ORAL DAILY
Qty: 30 TABLET | Refills: 5 | Status: CANCELLED | OUTPATIENT
Start: 2023-06-29

## 2023-06-29 RX ORDER — AMLODIPINE BESYLATE 10 MG/1
10 TABLET ORAL DAILY
Qty: 30 TABLET | Refills: 5 | Status: SHIPPED | OUTPATIENT
Start: 2023-06-29

## 2023-07-06 ENCOUNTER — TELEPHONE (OUTPATIENT)
Dept: FAMILY MEDICINE CLINIC | Age: 58
End: 2023-07-06

## 2023-07-06 NOTE — TELEPHONE ENCOUNTER
Pt's daughter in law called stating pt is having headache,nausea and BP reading of 220-100. Symptoms started at noon.  Advised pt to go to the ER to be evaluated -SAMANTHA

## 2023-07-11 ENCOUNTER — OFFICE VISIT (OUTPATIENT)
Dept: FAMILY MEDICINE CLINIC | Age: 58
End: 2023-07-11
Payer: COMMERCIAL

## 2023-07-11 VITALS
OXYGEN SATURATION: 95 % | BODY MASS INDEX: 34.68 KG/M2 | DIASTOLIC BLOOD PRESSURE: 82 MMHG | HEIGHT: 59 IN | HEART RATE: 102 BPM | WEIGHT: 172 LBS | SYSTOLIC BLOOD PRESSURE: 140 MMHG

## 2023-07-11 DIAGNOSIS — Z12.11 SCREENING FOR COLORECTAL CANCER: ICD-10-CM

## 2023-07-11 DIAGNOSIS — Z12.12 SCREENING FOR COLORECTAL CANCER: ICD-10-CM

## 2023-07-11 DIAGNOSIS — M79.89 LEG SWELLING: ICD-10-CM

## 2023-07-11 DIAGNOSIS — I10 PRIMARY HYPERTENSION: Primary | ICD-10-CM

## 2023-07-11 PROCEDURE — G8427 DOCREV CUR MEDS BY ELIG CLIN: HCPCS | Performed by: FAMILY MEDICINE

## 2023-07-11 PROCEDURE — 99214 OFFICE O/P EST MOD 30 MIN: CPT | Performed by: FAMILY MEDICINE

## 2023-07-11 PROCEDURE — 3074F SYST BP LT 130 MM HG: CPT | Performed by: FAMILY MEDICINE

## 2023-07-11 PROCEDURE — 3017F COLORECTAL CA SCREEN DOC REV: CPT | Performed by: FAMILY MEDICINE

## 2023-07-11 PROCEDURE — G8417 CALC BMI ABV UP PARAM F/U: HCPCS | Performed by: FAMILY MEDICINE

## 2023-07-11 PROCEDURE — 3078F DIAST BP <80 MM HG: CPT | Performed by: FAMILY MEDICINE

## 2023-07-11 PROCEDURE — 1036F TOBACCO NON-USER: CPT | Performed by: FAMILY MEDICINE

## 2023-07-11 RX ORDER — ESCITALOPRAM OXALATE 20 MG/1
TABLET ORAL
Qty: 90 TABLET | Refills: 1 | Status: SHIPPED | OUTPATIENT
Start: 2023-07-11

## 2023-07-11 RX ORDER — TIZANIDINE 4 MG/1
TABLET ORAL
Qty: 180 TABLET | Refills: 1 | Status: SHIPPED | OUTPATIENT
Start: 2023-07-11

## 2023-07-11 RX ORDER — LOSARTAN POTASSIUM 50 MG/1
50 TABLET ORAL DAILY
Qty: 30 TABLET | Refills: 1 | Status: SHIPPED | OUTPATIENT
Start: 2023-07-11

## 2023-07-11 ASSESSMENT — ENCOUNTER SYMPTOMS
NAUSEA: 0
DIARRHEA: 0
ABDOMINAL PAIN: 0
SHORTNESS OF BREATH: 0
VOMITING: 0

## 2023-07-11 NOTE — PROGRESS NOTES
HPI Notes    Name: Marcy Menchaca  : 1965        Chief Complaint:     Chief Complaint   Patient presents with    Hypertension     Htn urgency, bilateral lower extremity edema, sob with exertion, daily headaches    Foot Swelling       History of Present Illness:     Marcy Menchaca is a 63 y.o.  female who presents with Hypertension (Htn urgency, bilateral lower extremity edema, sob with exertion, daily headaches) and Foot Swelling      Hypertension  This is a new problem. The current episode started more than 1 month ago. The problem has been waxing and waning since onset. The problem is controlled (BP was high the other day but did come down with taking 2 norvasc 10mg. Pt did have migraine and hgit BP another time and went to ER and givne clonidine. pt feeling good now and BP is better but now lower leg swelling . ). Associated symptoms include headaches and peripheral edema. Pertinent negatives include no chest pain, palpitations or shortness of breath. There are no associated agents to hypertension. Risk factors for coronary artery disease include post-menopausal state. The current treatment provides significant improvement. Foot Swelling   This is a new problem. The current episode started 1 to 4 weeks ago (pt having more lower leg and feet swelling and started AFTER taking the [facility]). There has been no history of extremity trauma. The problem occurs constantly. Pertinent negatives include no fever, numbness or tingling.      Past Medical History:     Past Medical History:   Diagnosis Date    Anemia as a teen    Cerebral artery occlusion with cerebral infarction ([address])     \"mini stroke\"    Crohn's     Headache, classical migraine     migraines    Hyperlipidemia     Nausea & vomiting     Problems with head, neck, and trunk       Reviewed all health maintenance requirements and ordered appropriate tests  Health Maintenance Due   Topic Date Due    HIV screen  Never done    Hepatitis C screen  Never done

## 2023-08-28 ENCOUNTER — HOSPITAL ENCOUNTER (EMERGENCY)
Age: 58
Discharge: HOME | End: 2023-08-28
Attending: EMERGENCY MEDICINE
Payer: COMMERCIAL

## 2023-08-28 VITALS
DIASTOLIC BLOOD PRESSURE: 103 MMHG | HEART RATE: 72 BPM | HEIGHT: 59 IN | TEMPERATURE: 97.8 F | BODY MASS INDEX: 35.28 KG/M2 | WEIGHT: 175 LBS | RESPIRATION RATE: 20 BRPM | SYSTOLIC BLOOD PRESSURE: 179 MMHG | OXYGEN SATURATION: 95 %

## 2023-08-28 DIAGNOSIS — L23.7 POISON IVY DERMATITIS: Primary | ICD-10-CM

## 2023-08-28 PROCEDURE — 99283 EMERGENCY DEPT VISIT LOW MDM: CPT

## 2023-08-28 RX ORDER — PREDNISONE 20 MG/1
TABLET ORAL
Qty: 18 TABLET | Refills: 0 | Status: SHIPPED | OUTPATIENT
Start: 2023-08-28

## 2023-08-28 ASSESSMENT — PAIN - FUNCTIONAL ASSESSMENT: PAIN_FUNCTIONAL_ASSESSMENT: NONE - DENIES PAIN

## 2023-10-05 RX ORDER — HYDROCHLOROTHIAZIDE 25 MG/1
25 TABLET ORAL DAILY
Qty: 30 TABLET | Refills: 5 | Status: SHIPPED | OUTPATIENT
Start: 2023-10-05

## 2023-10-05 RX ORDER — LOSARTAN POTASSIUM 50 MG/1
50 TABLET ORAL DAILY
Qty: 30 TABLET | Refills: 5 | Status: SHIPPED | OUTPATIENT
Start: 2023-10-05

## 2023-10-05 NOTE — TELEPHONE ENCOUNTER
Last visit:  7/11/2023  Next Visit Date:    Future Appointments   Date Time Provider [address]   10/12/2023  4:20 PM MD Francisco Alvarez [facility]         Medication List:  Prior to Admission medications    Medication Sig Start Date End Date Taking?  Authorizing Provider   predniSONE (DELTASONE) 20 MG tablet 3 tablets daily x3 days then 2 tablets daily x3 days then 1 tablet daily x3 days 8/28/23   Haley Hills MD   escitalopram (LEXAPRO) 20 MG tablet TAKE 1 TABLET BY MOUTH ONE TIME A DAY 7/11/23   Alessandra Contreras MD   tiZANidine (ZANAFLEX) 4 MG tablet TAKE TWO TABLETS BY MOUTH EVERY EVENING 7/11/23   Alessandra Contreras MD   losartan (COZAAR) 50 MG tablet Take 1 tablet by mouth daily 7/11/23   Alessandra Contreras MD   ondansetron (ZOFRAN ODT) 4 MG disintegrating tablet Take 1 tablet by mouth every 8 hours as needed for Nausea or Vomiting 6/13/23   Alessandra Contreras MD   hydroCHLOROthiazide (HYDRODIURIL) 25 MG tablet Take 1 tablet by mouth daily 6/13/23   Alessandra Contreras MD   butalbital-APAP-caffeine -40 MG CAPS per capsule Take 1 capsule by mouth every 4 hours as needed for Headaches or Migraine 6/7/23   Luisa Johnson MD   levothyroxine (SYNTHROID) 50 MCG tablet Take 1 tablet by mouth daily 5/10/23   Alessandra Contreras MD   ibuprofen (ADVIL;MOTRIN) 800 MG tablet Take 1 tablet by mouth every 8 hours as needed for Pain 5/11/17   Dia Lucas DO

## 2023-10-12 ENCOUNTER — OFFICE VISIT (OUTPATIENT)
Dept: FAMILY MEDICINE CLINIC | Age: 58
End: 2023-10-12
Payer: COMMERCIAL

## 2023-10-12 VITALS
DIASTOLIC BLOOD PRESSURE: 78 MMHG | WEIGHT: 166 LBS | BODY MASS INDEX: 33.47 KG/M2 | SYSTOLIC BLOOD PRESSURE: 136 MMHG | HEIGHT: 59 IN | HEART RATE: 72 BPM | OXYGEN SATURATION: 96 %

## 2023-10-12 DIAGNOSIS — G43.109 MIGRAINE WITH AURA AND WITHOUT STATUS MIGRAINOSUS, NOT INTRACTABLE: ICD-10-CM

## 2023-10-12 DIAGNOSIS — I10 PRIMARY HYPERTENSION: Primary | ICD-10-CM

## 2023-10-12 PROCEDURE — G8427 DOCREV CUR MEDS BY ELIG CLIN: HCPCS | Performed by: FAMILY MEDICINE

## 2023-10-12 PROCEDURE — 3078F DIAST BP <80 MM HG: CPT | Performed by: FAMILY MEDICINE

## 2023-10-12 PROCEDURE — 99213 OFFICE O/P EST LOW 20 MIN: CPT | Performed by: FAMILY MEDICINE

## 2023-10-12 PROCEDURE — G8482 FLU IMMUNIZE ORDER/ADMIN: HCPCS | Performed by: FAMILY MEDICINE

## 2023-10-12 PROCEDURE — 1036F TOBACCO NON-USER: CPT | Performed by: FAMILY MEDICINE

## 2023-10-12 PROCEDURE — 3075F SYST BP GE 130 - 139MM HG: CPT | Performed by: FAMILY MEDICINE

## 2023-10-12 PROCEDURE — 3017F COLORECTAL CA SCREEN DOC REV: CPT | Performed by: FAMILY MEDICINE

## 2023-10-12 PROCEDURE — G8417 CALC BMI ABV UP PARAM F/U: HCPCS | Performed by: FAMILY MEDICINE

## 2023-10-12 RX ORDER — HYDROXYZINE HYDROCHLORIDE 25 MG/1
25 TABLET, FILM COATED ORAL 3 TIMES DAILY PRN
COMMUNITY
Start: 2023-08-25

## 2023-10-12 RX ORDER — ONDANSETRON 4 MG/1
4 TABLET, ORALLY DISINTEGRATING ORAL EVERY 8 HOURS PRN
Qty: 30 TABLET | Refills: 1 | Status: SHIPPED | OUTPATIENT
Start: 2023-10-12

## 2023-10-12 ASSESSMENT — ENCOUNTER SYMPTOMS
FACIAL SWELLING: 0
SHORTNESS OF BREATH: 0
COUGH: 0
BLOOD IN STOOL: 0
DIARRHEA: 0
VOMITING: 0
EYE REDNESS: 0
EYE DISCHARGE: 0

## 2023-10-12 NOTE — PROGRESS NOTES
HPI Notes    Name: Sally Russell  : 1965        Chief Complaint:     Chief Complaint   Patient presents with    Hypertension    Migraine       History of Present Illness:     aSlly Russell is a 63 y.o.  female who presents with Hypertension and Migraine      Hypertension  This is a chronic problem. The current episode started more than 1 year ago. The problem is unchanged. The problem is controlled. Associated symptoms include headaches. Pertinent negatives include no chest pain, malaise/fatigue, palpitations, peripheral edema or shortness of breath. Risk factors for coronary artery disease include post-menopausal state. The current treatment provides significant improvement. Migraine   This is a chronic problem. The current episode started more than 1 year ago. The problem occurs intermittently. The problem has been unchanged (pt states HAs have been less and thus well controlled. ). The pain does not radiate. The pain quality is similar to prior headaches. Pertinent negatives include no coughing, dizziness, eye redness, fever or vomiting. Treatments tried: muscle relaxants, zofran and butalbitalAPAP caffiene. Hypothyroidism- Chronic/stable, Patient denies changes in weight,bowels,palpitations. Energy is good and pt has had labs     Pt is taking the Ozempic fro 6-7wks and has been losing about 12lbs. She is taking it from [employer] who has a health [employer].  NO nausea or side effects  Past Medical History:     Past Medical History:   Diagnosis Date    Anemia as a teen    Cerebral artery occlusion with cerebral infarction ([address])     \"mini stroke\"    Crohn's     Headache, classical migraine     migraines    Hyperlipidemia     Nausea & vomiting     Problems with head, neck, and trunk       Reviewed all health maintenance requirements and ordered appropriate tests  Health Maintenance Due   Topic Date Due    HIV screen  Never done    Hepatitis C screen  Never done    Diabetes screen  Never done    Shingles

## 2023-10-25 ENCOUNTER — HOSPITAL ENCOUNTER (OUTPATIENT)
Dept: MAMMOGRAPHY | Age: 58
Discharge: HOME | End: 2023-10-27
Attending: FAMILY MEDICINE
Payer: COMMERCIAL

## 2023-10-25 DIAGNOSIS — Z12.31 ENCOUNTER FOR SCREENING MAMMOGRAM FOR MALIGNANT NEOPLASM OF BREAST: ICD-10-CM

## 2023-10-25 PROCEDURE — 77063 BREAST TOMOSYNTHESIS BI: CPT

## 2023-11-30 DIAGNOSIS — E03.9 ACQUIRED HYPOTHYROIDISM: ICD-10-CM

## 2023-11-30 RX ORDER — LEVOTHYROXINE SODIUM 0.05 MG/1
50 TABLET ORAL DAILY
Qty: 30 TABLET | Refills: 5 | OUTPATIENT
Start: 2023-11-30

## 2023-11-30 RX ORDER — LEVOTHYROXINE SODIUM 0.05 MG/1
50 TABLET ORAL DAILY
Qty: 30 TABLET | Refills: 5 | Status: SHIPPED | OUTPATIENT
Start: 2023-11-30

## 2023-11-30 NOTE — TELEPHONE ENCOUNTER
Last OV: 10/12/2023  chronic   Last RX:    Next scheduled apt: 04/11/24 chronic           [facility] requesting a refill

## 2024-03-12 DIAGNOSIS — F41.0 PANIC DISORDER (EPISODIC PAROXYSMAL ANXIETY): ICD-10-CM

## 2024-03-12 DIAGNOSIS — G43.109 MIGRAINE WITH AURA, NOT INTRACTABLE, WITHOUT STATUS MIGRAINOSUS: ICD-10-CM

## 2024-03-12 DIAGNOSIS — F41.9 ANXIETY DISORDER, UNSPECIFIED: ICD-10-CM

## 2024-03-13 RX ORDER — ESCITALOPRAM OXALATE 20 MG/1
TABLET ORAL
Qty: 90 TABLET | Refills: 1 | Status: SHIPPED | OUTPATIENT
Start: 2024-03-13

## 2024-03-13 RX ORDER — TIZANIDINE 4 MG/1
TABLET ORAL
Qty: 180 TABLET | Refills: 1 | Status: SHIPPED | OUTPATIENT
Start: 2024-03-13

## 2024-03-13 NOTE — TELEPHONE ENCOUNTER
Last OV 10/12/23 for HTN , migraine  Requesting refill on tizanidine and lexapro thru [facility]  Next OV 4/11/24

## 2024-04-11 ENCOUNTER — OFFICE VISIT (OUTPATIENT)
Dept: FAMILY MEDICINE CLINIC | Age: 59
End: 2024-04-11
Payer: COMMERCIAL

## 2024-04-11 VITALS
SYSTOLIC BLOOD PRESSURE: 124 MMHG | OXYGEN SATURATION: 98 % | BODY MASS INDEX: 31.91 KG/M2 | HEART RATE: 72 BPM | WEIGHT: 158 LBS | DIASTOLIC BLOOD PRESSURE: 84 MMHG

## 2024-04-11 DIAGNOSIS — Z12.11 COLON CANCER SCREENING: Primary | ICD-10-CM

## 2024-04-11 DIAGNOSIS — G43.819 OTHER MIGRAINE WITHOUT STATUS MIGRAINOSUS, INTRACTABLE: ICD-10-CM

## 2024-04-11 DIAGNOSIS — E03.9 ACQUIRED HYPOTHYROIDISM: ICD-10-CM

## 2024-04-11 DIAGNOSIS — I10 PRIMARY HYPERTENSION: ICD-10-CM

## 2024-04-11 PROCEDURE — G8427 DOCREV CUR MEDS BY ELIG CLIN: HCPCS | Performed by: FAMILY MEDICINE

## 2024-04-11 PROCEDURE — 3079F DIAST BP 80-89 MM HG: CPT | Performed by: FAMILY MEDICINE

## 2024-04-11 PROCEDURE — 3074F SYST BP LT 130 MM HG: CPT | Performed by: FAMILY MEDICINE

## 2024-04-11 PROCEDURE — 3017F COLORECTAL CA SCREEN DOC REV: CPT | Performed by: FAMILY MEDICINE

## 2024-04-11 PROCEDURE — G8417 CALC BMI ABV UP PARAM F/U: HCPCS | Performed by: FAMILY MEDICINE

## 2024-04-11 PROCEDURE — 99214 OFFICE O/P EST MOD 30 MIN: CPT | Performed by: FAMILY MEDICINE

## 2024-04-11 PROCEDURE — 1036F TOBACCO NON-USER: CPT | Performed by: FAMILY MEDICINE

## 2024-04-11 RX ORDER — LEVOTHYROXINE SODIUM 0.05 MG/1
50 TABLET ORAL DAILY
Qty: 90 TABLET | Refills: 1 | Status: SHIPPED | OUTPATIENT
Start: 2024-04-11

## 2024-04-11 RX ORDER — BUTALBITAL, ACETAMINOPHEN AND CAFFEINE 300; 40; 50 MG/1; MG/1; MG/1
1 CAPSULE ORAL EVERY 4 HOURS PRN
Qty: 20 CAPSULE | Refills: 0 | Status: SHIPPED | OUTPATIENT
Start: 2024-04-11

## 2024-04-11 RX ORDER — LOSARTAN POTASSIUM 50 MG/1
50 TABLET ORAL DAILY
Qty: 90 TABLET | Refills: 1 | Status: SHIPPED | OUTPATIENT
Start: 2024-04-11

## 2024-04-11 RX ORDER — SEMAGLUTIDE 1.7 MG/.75ML
1.7 INJECTION, SOLUTION SUBCUTANEOUS
COMMUNITY
Start: 2024-01-07

## 2024-04-11 RX ORDER — HYDROXYZINE HYDROCHLORIDE 25 MG/1
25 TABLET, FILM COATED ORAL 3 TIMES DAILY PRN
Qty: 30 TABLET | Refills: 0 | Status: SHIPPED | OUTPATIENT
Start: 2024-04-11

## 2024-04-11 RX ORDER — ONDANSETRON 4 MG/1
4 TABLET, ORALLY DISINTEGRATING ORAL EVERY 8 HOURS PRN
Qty: 30 TABLET | Refills: 1 | Status: SHIPPED | OUTPATIENT
Start: 2024-04-11

## 2024-04-11 RX ORDER — HYDROCHLOROTHIAZIDE 25 MG/1
25 TABLET ORAL DAILY
Qty: 90 TABLET | Refills: 1 | Status: SHIPPED | OUTPATIENT
Start: 2024-04-11

## 2024-04-11 ASSESSMENT — ENCOUNTER SYMPTOMS
EYE PAIN: 0
EYE REDNESS: 0
VOMITING: 0
NAUSEA: 0
SHORTNESS OF BREATH: 0

## 2024-04-11 ASSESSMENT — PATIENT HEALTH QUESTIONNAIRE - PHQ9
SUM OF ALL RESPONSES TO PHQ QUESTIONS 1-9: 0
SUM OF ALL RESPONSES TO PHQ QUESTIONS 1-9: 0
SUM OF ALL RESPONSES TO PHQ9 QUESTIONS 1 & 2: 0
2. FEELING DOWN, DEPRESSED OR HOPELESS: NOT AT ALL
SUM OF ALL RESPONSES TO PHQ QUESTIONS 1-9: 0
SUM OF ALL RESPONSES TO PHQ QUESTIONS 1-9: 0
1. LITTLE INTEREST OR PLEASURE IN DOING THINGS: NOT AT ALL

## 2024-04-11 NOTE — PROGRESS NOTES
HPI Notes    Name: Sadie Perea  : 1965        Chief Complaint:     Chief Complaint   Patient presents with    Migraine    Hypertension    Hypothyroidism       History of Present Illness:     Sadie Perea is a 58 y.o.  female who presents with Migraine, Hypertension, and Hypothyroidism      Migraine   This is a chronic problem. The current episode started more than 1 year ago. The problem has been gradually improving (now pt is getting some redness in her eyes before the migraines. Pt is not having migraines as often --- maybe once a month if that). The pain is located in the Left unilateral region. The pain does not radiate. The pain quality is similar to prior headaches. The quality of the pain is described as throbbing. The pain is mild. Pertinent negatives include no ear pain, eye pain, eye redness, fever, nausea or vomiting. Associated symptoms comments: Pt has been having eye redness prior to HA and then some nausea. The treatment provided significant relief. Her past medical history is significant for migraine headaches.   Hypertension  This is a chronic problem. The current episode started more than 1 year ago. The problem is unchanged. The problem is controlled. Pertinent negatives include no chest pain, malaise/fatigue, palpitations, peripheral edema or shortness of breath. There are no associated agents to hypertension. Risk factors for coronary artery disease include post-menopausal state. The current treatment provides significant improvement.     Hypothyroidism- Chronic/stable, Patient denies changes in weight,bowels,palpitations. Energy is good Last TSH normal in 2023.      Past Medical History:     Past Medical History:   Diagnosis Date    Anemia as a teen    Cerebral artery occlusion with cerebral infarction (HCC)     \"mini stroke\"    Crohn's     Headache, classical migraine     migraines    Hyperlipidemia     Nausea & vomiting     Problems with head, neck, and trunk       Reviewed

## 2024-09-19 DIAGNOSIS — G43.109 MIGRAINE WITH AURA, NOT INTRACTABLE, WITHOUT STATUS MIGRAINOSUS: ICD-10-CM

## 2024-09-19 RX ORDER — HYDROCHLOROTHIAZIDE 25 MG/1
25 TABLET ORAL DAILY
Qty: 90 TABLET | Refills: 1 | Status: SHIPPED | OUTPATIENT
Start: 2024-09-19

## 2024-09-19 RX ORDER — LOSARTAN POTASSIUM 50 MG/1
50 TABLET ORAL DAILY
Qty: 90 TABLET | Refills: 1 | Status: SHIPPED | OUTPATIENT
Start: 2024-09-19

## 2024-10-04 DIAGNOSIS — F41.9 ANXIETY DISORDER, UNSPECIFIED: ICD-10-CM

## 2024-10-04 DIAGNOSIS — F41.0 PANIC DISORDER (EPISODIC PAROXYSMAL ANXIETY): ICD-10-CM

## 2024-10-04 RX ORDER — ESCITALOPRAM OXALATE 20 MG/1
TABLET ORAL
Qty: 90 TABLET | Refills: 1 | Status: SHIPPED | OUTPATIENT
Start: 2024-10-04

## 2024-10-04 NOTE — TELEPHONE ENCOUNTER
Last OV 4/11/24     Next OV 10/10/24    Requesting refill on escitalopram thru [facility]   Rx pending

## 2024-10-24 ENCOUNTER — TELEPHONE (OUTPATIENT)
Dept: FAMILY MEDICINE CLINIC | Age: 59
End: 2024-10-24

## 2024-10-24 NOTE — TELEPHONE ENCOUNTER
Bret,      Looks like pt had her colonoscopy today and only 2 polyps so can were update chart --- care gap and then good for 10yrs for now.   Thanks,         Dr Fernando

## 2024-10-28 DIAGNOSIS — E03.9 ACQUIRED HYPOTHYROIDISM: ICD-10-CM

## 2024-10-29 RX ORDER — LEVOTHYROXINE SODIUM 50 UG/1
50 TABLET ORAL DAILY
Qty: 90 TABLET | Refills: 1 | Status: SHIPPED | OUTPATIENT
Start: 2024-10-29

## 2024-10-29 NOTE — TELEPHONE ENCOUNTER
Rx refill request via [employer]  Levothyroxine 5mcg qd  Last OV 4/11/24 for hypothyroidism  Next appt- 11-20-24

## 2024-11-20 ENCOUNTER — OFFICE VISIT (OUTPATIENT)
Dept: FAMILY MEDICINE CLINIC | Age: 59
End: 2024-11-20
Payer: COMMERCIAL

## 2024-11-20 ENCOUNTER — HOSPITAL ENCOUNTER (OUTPATIENT)
Age: 59
Discharge: HOME | End: 2024-11-20
Payer: COMMERCIAL

## 2024-11-20 VITALS
WEIGHT: 161 LBS | BODY MASS INDEX: 32.52 KG/M2 | SYSTOLIC BLOOD PRESSURE: 128 MMHG | DIASTOLIC BLOOD PRESSURE: 84 MMHG | HEART RATE: 80 BPM | OXYGEN SATURATION: 94 %

## 2024-11-20 DIAGNOSIS — E03.9 ACQUIRED HYPOTHYROIDISM: ICD-10-CM

## 2024-11-20 DIAGNOSIS — I10 PRIMARY HYPERTENSION: Primary | ICD-10-CM

## 2024-11-20 DIAGNOSIS — G43.819 OTHER MIGRAINE WITHOUT STATUS MIGRAINOSUS, INTRACTABLE: ICD-10-CM

## 2024-11-20 DIAGNOSIS — I10 PRIMARY HYPERTENSION: ICD-10-CM

## 2024-11-20 DIAGNOSIS — F41.9 ANXIETY DISORDER, UNSPECIFIED TYPE: ICD-10-CM

## 2024-11-20 LAB
ALBUMIN SERPL-MCNC: 3.9 G/DL (ref 3.5–5.2)
ALP SERPL-CCNC: 94 U/L (ref 35–104)
ALT SERPL-CCNC: 37 U/L (ref 5–33)
ANION GAP SERPL CALCULATED.3IONS-SCNC: 11 MMOL/L (ref 9–17)
AST SERPL-CCNC: 27 U/L
BILIRUB SERPL-MCNC: 0.4 MG/DL (ref 0.3–1.2)
BUN SERPL-MCNC: 14 MG/DL (ref 6–20)
BUN/CREAT SERPL: 18 (ref 9–20)
CALCIUM SERPL-MCNC: 9.4 MG/DL (ref 8.6–10.4)
CHLORIDE SERPL-SCNC: 99 MMOL/L (ref 98–107)
CHOLEST SERPL-MCNC: 253 MG/DL (ref 0–199)
CHOLESTEROL/HDL RATIO: 4.6
CO2 SERPL-SCNC: 25 MMOL/L (ref 20–31)
CREAT SERPL-MCNC: 0.8 MG/DL (ref 0.5–0.9)
GFR, ESTIMATED: 85 ML/MIN/1.73M2
GLUCOSE SERPL-MCNC: 97 MG/DL (ref 70–99)
HDLC SERPL-MCNC: 55 MG/DL
LDLC SERPL CALC-MCNC: 179 MG/DL (ref 0–100)
POTASSIUM SERPL-SCNC: 4.2 MMOL/L (ref 3.7–5.3)
PROT SERPL-MCNC: 7 G/DL (ref 6.4–8.3)
SODIUM SERPL-SCNC: 135 MMOL/L (ref 135–144)
T4 FREE SERPL-MCNC: 1.2 NG/DL (ref 0.92–1.68)
TRIGL SERPL-MCNC: 94 MG/DL
TSH SERPL DL<=0.05 MIU/L-ACNC: 4.83 UIU/ML (ref 0.3–5)
VLDLC SERPL CALC-MCNC: 19 MG/DL (ref 1–30)

## 2024-11-20 PROCEDURE — 80053 COMPREHEN METABOLIC PANEL: CPT

## 2024-11-20 PROCEDURE — 3074F SYST BP LT 130 MM HG: CPT | Performed by: FAMILY MEDICINE

## 2024-11-20 PROCEDURE — 84439 ASSAY OF FREE THYROXINE: CPT

## 2024-11-20 PROCEDURE — G8427 DOCREV CUR MEDS BY ELIG CLIN: HCPCS | Performed by: FAMILY MEDICINE

## 2024-11-20 PROCEDURE — G8417 CALC BMI ABV UP PARAM F/U: HCPCS | Performed by: FAMILY MEDICINE

## 2024-11-20 PROCEDURE — 36415 COLL VENOUS BLD VENIPUNCTURE: CPT

## 2024-11-20 PROCEDURE — 3017F COLORECTAL CA SCREEN DOC REV: CPT | Performed by: FAMILY MEDICINE

## 2024-11-20 PROCEDURE — 1036F TOBACCO NON-USER: CPT | Performed by: FAMILY MEDICINE

## 2024-11-20 PROCEDURE — 80061 LIPID PANEL: CPT

## 2024-11-20 PROCEDURE — 84443 ASSAY THYROID STIM HORMONE: CPT

## 2024-11-20 PROCEDURE — G8484 FLU IMMUNIZE NO ADMIN: HCPCS | Performed by: FAMILY MEDICINE

## 2024-11-20 PROCEDURE — 3079F DIAST BP 80-89 MM HG: CPT | Performed by: FAMILY MEDICINE

## 2024-11-20 PROCEDURE — 99214 OFFICE O/P EST MOD 30 MIN: CPT | Performed by: FAMILY MEDICINE

## 2024-11-20 RX ORDER — HYDROXYZINE HYDROCHLORIDE 25 MG/1
25 TABLET, FILM COATED ORAL 3 TIMES DAILY PRN
Qty: 30 TABLET | Refills: 0 | Status: SHIPPED | OUTPATIENT
Start: 2024-11-20

## 2024-11-20 RX ORDER — ONDANSETRON 4 MG/1
4 TABLET, ORALLY DISINTEGRATING ORAL EVERY 8 HOURS PRN
Qty: 30 TABLET | Refills: 1 | Status: SHIPPED | OUTPATIENT
Start: 2024-11-20

## 2024-11-20 SDOH — ECONOMIC STABILITY: FOOD INSECURITY: WITHIN THE PAST 12 MONTHS, THE FOOD YOU BOUGHT JUST DIDN'T LAST AND YOU DIDN'T HAVE MONEY TO GET MORE.: NEVER TRUE

## 2024-11-20 SDOH — ECONOMIC STABILITY: INCOME INSECURITY: HOW HARD IS IT FOR YOU TO PAY FOR THE VERY BASICS LIKE FOOD, HOUSING, MEDICAL CARE, AND HEATING?: NOT HARD AT ALL

## 2024-11-20 SDOH — ECONOMIC STABILITY: FOOD INSECURITY: WITHIN THE PAST 12 MONTHS, YOU WORRIED THAT YOUR FOOD WOULD RUN OUT BEFORE YOU GOT MONEY TO BUY MORE.: NEVER TRUE

## 2024-11-20 ASSESSMENT — ENCOUNTER SYMPTOMS
SHORTNESS OF BREATH: 0
CONSTIPATION: 0
ABDOMINAL PAIN: 0
VOMITING: 0
BLURRED VISION: 0
DIARRHEA: 0
EYE REDNESS: 0
EYE DISCHARGE: 0
EYE PAIN: 0
SORE THROAT: 0
BLOOD IN STOOL: 0
NAUSEA: 0
COUGH: 0

## 2024-11-20 NOTE — PROGRESS NOTES
HPI Notes    Name: Sadie Perea  : 1965        Chief Complaint:     Chief Complaint   Patient presents with    Hypertension     6 month check up    Hypothyroidism    Migraine    Mental Health Problem     Refill on hydroxyzine       History of Present Illness:     Sadie Perea is a 59 y.o.  female who presents with Hypertension (6 month check up), Hypothyroidism, Migraine, and Mental Health Problem (Refill on hydroxyzine)      Hypertension  This is a chronic problem. The current episode started more than 1 year ago. The problem is unchanged. The problem is controlled. Pertinent negatives include no blurred vision, chest pain, headaches, malaise/fatigue, neck pain, palpitations, peripheral edema or shortness of breath. There are no associated agents to hypertension. Risk factors for coronary artery disease include post-menopausal state.   Migraine   This is a chronic problem. The current episode started more than 1 year ago. The problem has been unchanged. The pain is located in the Bilateral region. The pain quality is not similar to prior headaches. Pertinent negatives include no abdominal pain, blurred vision, coughing, dizziness, ear pain, eye pain, eye redness, fever, insomnia, nausea, neck pain, numbness, sore throat or vomiting. Treatments tried: fiorect and zofran as needed. The treatment provided significant relief. Her past medical history is significant for migraine headaches.   Mental Health Problem  The primary symptoms do not include dysphoric mood, delusions, hallucinations or disorganized speech. Primary symptoms comment: overall doing well on the lexapro.. The current episode started more than 1 month ago.   The onset of the illness is precipitated by emotional stress (pt stateshe has a little more anxiety or stress this time of year so taking hydralzine more as needed.). The degree of incapacity that she is experiencing as a consequence of her illness is mild. Additional symptoms of the

## 2024-11-20 NOTE — PATIENT INSTRUCTIONS
SURVEY:    You may be receiving a survey from [facility] regarding your visit today.    Please complete the survey to enable us to provide the highest quality of care to you and your family.    If you cannot score us a very good (5 Stars) on any question, please call the office to discuss how we could have made your experience a very good one.    Thank you.    Clinical Care Team: MD Bret Golden LPN              Triage: Edie Ford CMA              Clerical Team: Edie Mott

## 2024-11-21 ENCOUNTER — TELEPHONE (OUTPATIENT)
Dept: FAMILY MEDICINE CLINIC | Age: 59
End: 2024-11-21

## 2024-11-21 DIAGNOSIS — E78.49 OTHER HYPERLIPIDEMIA: Primary | ICD-10-CM

## 2024-11-21 RX ORDER — ATORVASTATIN CALCIUM 10 MG/1
10 TABLET, FILM COATED ORAL DAILY
COMMUNITY
End: 2024-11-21 | Stop reason: SDUPTHER

## 2024-11-21 RX ORDER — ATORVASTATIN CALCIUM 10 MG/1
10 TABLET, FILM COATED ORAL DAILY
Qty: 90 TABLET | Refills: 1 | Status: SHIPPED | OUTPATIENT
Start: 2024-11-21

## 2024-11-21 NOTE — TELEPHONE ENCOUNTER
----- Message from Dr. Kassie Franks MD sent at 11/21/2024  1:12 PM EST -----  Tell pt labs:  1.) Total cholesterol 253 and was 231 but HDL is also up and good at 55 from 44 but the bad  and was 167 so I would have her consider low dose statin   2.) tsh 4.83 and free t4 all normal  3.) liver, kidneys and electrolytes all normal and sugar 97

## 2024-11-21 NOTE — TELEPHONE ENCOUNTER
Patient notified of lab results and recommendations.   Patient said she is okay with starting low dose statin and she was told to repeat her labs in 3 months    Orders pending    Atorvastatin 10mg per  for statin  Medication pending

## 2024-12-19 ENCOUNTER — HOSPITAL ENCOUNTER (EMERGENCY)
Age: 59
Discharge: HOME | End: 2024-12-19
Attending: EMERGENCY MEDICINE
Payer: COMMERCIAL

## 2024-12-19 ENCOUNTER — APPOINTMENT (OUTPATIENT)
Dept: GENERAL RADIOLOGY | Age: 59
End: 2024-12-19
Payer: COMMERCIAL

## 2024-12-19 VITALS
WEIGHT: 157.6 LBS | OXYGEN SATURATION: 91 % | BODY MASS INDEX: 31.77 KG/M2 | RESPIRATION RATE: 22 BRPM | SYSTOLIC BLOOD PRESSURE: 136 MMHG | HEART RATE: 110 BPM | HEIGHT: 59 IN | DIASTOLIC BLOOD PRESSURE: 117 MMHG | TEMPERATURE: 98.7 F

## 2024-12-19 DIAGNOSIS — R11.2 NAUSEA AND VOMITING, UNSPECIFIED VOMITING TYPE: Primary | ICD-10-CM

## 2024-12-19 DIAGNOSIS — B97.89 VIRAL RESPIRATORY ILLNESS: ICD-10-CM

## 2024-12-19 DIAGNOSIS — J98.8 VIRAL RESPIRATORY ILLNESS: ICD-10-CM

## 2024-12-19 LAB
-: NORMAL
ALBUMIN SERPL-MCNC: 4.3 G/DL (ref 3.5–5.2)
ALP SERPL-CCNC: 107 U/L (ref 35–104)
ALT SERPL-CCNC: 33 U/L (ref 5–33)
ANION GAP SERPL CALCULATED.3IONS-SCNC: 17 MMOL/L (ref 9–17)
AST SERPL-CCNC: 53 U/L
BASOPHILS # BLD: 0.01 K/UL (ref 0–0.2)
BASOPHILS NFR BLD: 0 % (ref 0–2)
BILIRUB SERPL-MCNC: 0.6 MG/DL (ref 0.3–1.2)
BILIRUB UR QL STRIP: NEGATIVE
BUN SERPL-MCNC: 15 MG/DL (ref 6–20)
BUN/CREAT SERPL: 19 (ref 9–20)
CALCIUM SERPL-MCNC: 9.6 MG/DL (ref 8.6–10.4)
CHLORIDE SERPL-SCNC: 97 MMOL/L (ref 98–107)
CLARITY UR: CLEAR
CO2 SERPL-SCNC: 23 MMOL/L (ref 20–31)
COLOR UR: YELLOW
COMMENT: ABNORMAL
CREAT SERPL-MCNC: 0.8 MG/DL (ref 0.5–0.9)
EOSINOPHIL # BLD: 0.01 K/UL (ref 0–0.4)
EOSINOPHILS RELATIVE PERCENT: 0 % (ref 0–5)
EPI CELLS #/AREA URNS HPF: NORMAL /HPF
ERYTHROCYTE [DISTWIDTH] IN BLOOD BY AUTOMATED COUNT: 12.8 % (ref 12.1–15.2)
GFR, ESTIMATED: 85 ML/MIN/1.73M2
GLUCOSE SERPL-MCNC: 117 MG/DL (ref 70–99)
GLUCOSE UR STRIP-MCNC: NEGATIVE MG/DL
HCT VFR BLD AUTO: 45.8 % (ref 36–46)
HGB BLD-MCNC: 15.8 G/DL (ref 12–16)
HGB UR QL STRIP.AUTO: NEGATIVE
IMM GRANULOCYTES # BLD AUTO: 0.01 K/UL (ref 0–0.3)
IMM GRANULOCYTES NFR BLD: 0 % (ref 0–5)
KETONES UR STRIP-MCNC: ABNORMAL MG/DL
LEUKOCYTE ESTERASE UR QL STRIP: NEGATIVE
LYMPHOCYTES NFR BLD: 1.88 K/UL (ref 1–4.8)
LYMPHOCYTES RELATIVE PERCENT: 16 % (ref 15–40)
MAGNESIUM SERPL-MCNC: 2.1 MG/DL (ref 1.6–2.6)
MCH RBC QN AUTO: 30.2 PG (ref 26–34)
MCHC RBC AUTO-ENTMCNC: 34.5 G/DL (ref 31–37)
MCV RBC AUTO: 87.6 FL (ref 80–100)
MONOCYTES NFR BLD: 0.34 K/UL (ref 0–1)
MONOCYTES NFR BLD: 3 % (ref 4–8)
NEUTROPHILS NFR BLD: 81 % (ref 47–75)
NEUTS SEG NFR BLD: 9.48 K/UL (ref 2.5–7)
NITRITE UR QL STRIP: NEGATIVE
PH UR STRIP: 6.5 [PH] (ref 5–8)
PLATELET # BLD AUTO: 346 K/UL (ref 140–450)
PMV BLD AUTO: 10.9 FL (ref 6–12)
POTASSIUM SERPL-SCNC: 3.8 MMOL/L (ref 3.7–5.3)
PROT SERPL-MCNC: 7.9 G/DL (ref 6.4–8.3)
PROT UR STRIP-MCNC: ABNORMAL MG/DL
RBC # BLD AUTO: 5.23 M/UL (ref 4–5.2)
RBC #/AREA URNS HPF: NORMAL /HPF (ref 0–2)
SODIUM SERPL-SCNC: 137 MMOL/L (ref 135–144)
SP GR UR STRIP: 1.01 (ref 1–1.03)
UROBILINOGEN UR STRIP-ACNC: NORMAL EU/DL (ref 0–1)
WBC #/AREA URNS HPF: NORMAL /HPF
WBC OTHER # BLD: 11.7 K/UL (ref 3.5–11)

## 2024-12-19 PROCEDURE — 6360000002 HC RX W HCPCS: Performed by: EMERGENCY MEDICINE

## 2024-12-19 PROCEDURE — 80053 COMPREHEN METABOLIC PANEL: CPT

## 2024-12-19 PROCEDURE — 83735 ASSAY OF MAGNESIUM: CPT

## 2024-12-19 PROCEDURE — 96374 THER/PROPH/DIAG INJ IV PUSH: CPT

## 2024-12-19 PROCEDURE — 81001 URINALYSIS AUTO W/SCOPE: CPT

## 2024-12-19 PROCEDURE — 85025 COMPLETE CBC W/AUTO DIFF WBC: CPT

## 2024-12-19 PROCEDURE — 96375 TX/PRO/DX INJ NEW DRUG ADDON: CPT

## 2024-12-19 PROCEDURE — 2580000003 HC RX 258: Performed by: EMERGENCY MEDICINE

## 2024-12-19 PROCEDURE — 99284 EMERGENCY DEPT VISIT MOD MDM: CPT

## 2024-12-19 PROCEDURE — 71045 X-RAY EXAM CHEST 1 VIEW: CPT

## 2024-12-19 RX ORDER — 0.9 % SODIUM CHLORIDE 0.9 %
1000 INTRAVENOUS SOLUTION INTRAVENOUS ONCE
Status: COMPLETED | OUTPATIENT
Start: 2024-12-19 | End: 2024-12-19

## 2024-12-19 RX ORDER — ONDANSETRON 4 MG/1
4 TABLET, ORALLY DISINTEGRATING ORAL 3 TIMES DAILY PRN
Qty: 21 TABLET | Refills: 0 | Status: SHIPPED | OUTPATIENT
Start: 2024-12-19

## 2024-12-19 RX ORDER — ONDANSETRON 2 MG/ML
4 INJECTION INTRAMUSCULAR; INTRAVENOUS
Status: DISCONTINUED | OUTPATIENT
Start: 2024-12-19 | End: 2024-12-19 | Stop reason: HOSPADM

## 2024-12-19 RX ORDER — METOCLOPRAMIDE HYDROCHLORIDE 5 MG/ML
10 INJECTION INTRAMUSCULAR; INTRAVENOUS ONCE
Status: COMPLETED | OUTPATIENT
Start: 2024-12-19 | End: 2024-12-19

## 2024-12-19 RX ADMIN — SODIUM CHLORIDE 1000 ML: 9 INJECTION, SOLUTION INTRAVENOUS at 15:47

## 2024-12-19 RX ADMIN — SODIUM CHLORIDE 1000 ML: 9 INJECTION, SOLUTION INTRAVENOUS at 17:50

## 2024-12-19 RX ADMIN — ONDANSETRON 4 MG: 2 INJECTION INTRAMUSCULAR; INTRAVENOUS at 15:48

## 2024-12-19 RX ADMIN — METOCLOPRAMIDE 10 MG: 5 INJECTION, SOLUTION INTRAMUSCULAR; INTRAVENOUS at 17:50

## 2024-12-19 ASSESSMENT — PAIN DESCRIPTION - LOCATION: LOCATION: HEAD;GENERALIZED

## 2024-12-19 ASSESSMENT — LIFESTYLE VARIABLES
HOW OFTEN DO YOU HAVE A DRINK CONTAINING ALCOHOL: NEVER
HOW MANY STANDARD DRINKS CONTAINING ALCOHOL DO YOU HAVE ON A TYPICAL DAY: PATIENT DOES NOT DRINK

## 2024-12-19 ASSESSMENT — PAIN SCALES - GENERAL: PAINLEVEL_OUTOF10: 6

## 2024-12-19 ASSESSMENT — PAIN DESCRIPTION - DESCRIPTORS: DESCRIPTORS: ACHING

## 2024-12-19 ASSESSMENT — PAIN DESCRIPTION - PAIN TYPE: TYPE: ACUTE PAIN

## 2024-12-19 ASSESSMENT — PAIN DESCRIPTION - FREQUENCY: FREQUENCY: CONTINUOUS

## 2024-12-19 NOTE — ED PROVIDER NOTES
[facility]  EMERGENCY DEPARTMENT  eMERGENCY dEPARTMENT eNCOUnter      Pt Name: Sadie Perea  MRN: [id number]  Birthdate 1965  Date of evaluation: 12/19/2024  Provider: Tonio Nathan MD    CHIEF COMPLAINT       Chief Complaint   Patient presents with    Vomiting     Pt reports emesis, headache and congestion that started yesterday morning.     Cough     Pt reports having a productive cough for 2 weeks     Patient is a 59-year-old female who presents to the emergency department complaining of vomiting since yesterday morning.  She states she has had some congestion and a productive cough for a week or 2.  She states that beginning yesterday she began having vomiting.  She denies any diarrhea.  She denies any chest pain or abdominal pain.  She denies fever or chills or other symptoms.  She is not sure what brought it on.  She denies anyone else sick at home.        Nursing Notes were reviewed.    REVIEW OF SYSTEMS    (2-9 systems for level 4, 10 or more for level 5)     Review of Systems    Except as noted above the remainder of the review of systems was reviewed and negative.       PAST MEDICAL HISTORY     Past Medical History:   Diagnosis Date    Anemia as a teen    Cerebral artery occlusion with cerebral infarction (HCC)     \"mini stroke\"    Crohn's     Headache, classical migraine     migraines    Hyperlipidemia     Nausea & vomiting     Problems with head, neck, and trunk          SURGICAL HISTORY       Past Surgical History:   Procedure Laterality Date    BACK SURGERY      cervical spine injections    BLADDER SURGERY  1972    CARPAL TUNNEL RELEASE Right     right dequervains also    CERVICAL SPINE SURGERY  2004    HYSTERECTOMY, TOTAL ABDOMINAL (CERVIX REMOVED)  2001    OVARY REMOVAL  2008    SHOULDER SURGERY  2003         ALLERGIES     Norvasc [amlodipine] and Sulfa antibiotics    FAMILY HISTORY       Family History   Adopted: Yes          SOCIAL HISTORY       Social History     Socioeconomic History    Marital

## 2024-12-19 NOTE — ED TRIAGE NOTES
Pt arrives via private vehicle. Ambulates to room with slow, steady gait using handrails. Alert and oriented x4 during triage. Pt reports emesis, headache and congestion that started yesterday morning. Also reports having a productive cough for 2 weeks. Home med list reviewed with pt verbal recall.

## 2024-12-20 NOTE — DISCHARGE INSTR - COC
Continuity of Care Form    Patient Name: Sadie Perea   :  1965  MRN:  [id number]    Admit date:  2024  Discharge date:  ***    Code Status Order: Prior   Advance Directives:   Advance Care Flowsheet Documentation             Admitting Physician:  No admitting provider for patient encounter.  PCP: Kassie Franks MD    Discharging Nurse: ***  Discharging Hospital Unit/Room#:   Discharging Unit Phone Number: ***    Emergency Contact:   Extended Emergency Contact Information  Primary Emergency Contact: Flora Avila  Address: [place] [address]  Home Phone: [phone]  Relation: Parent  Secondary Emergency Contact: Kade Perea  Home Phone: [phone]  Relation: Child    Past Surgical History:  Past Surgical History:   Procedure Laterality Date    BACK SURGERY      cervical spine injections    BLADDER SURGERY      CARPAL TUNNEL RELEASE Right     right dequervains also    CERVICAL SPINE SURGERY      HYSTERECTOMY, TOTAL ABDOMINAL (CERVIX REMOVED)      OVARY REMOVAL      SHOULDER SURGERY         Immunization History:   Immunization History   Administered Date(s) Administered    COVID-19, MODERNA BLUE border, Primary or Immunocompromised, (age 12y+), IM, 100 mcg/0.5mL 2020, 2021, 2021    COVID-19, MODERNA Booster BLUE border, (age 18y+), IM, 50mcg/0.25mL 2021    COVID-19, MODERNA, (age 12y+), IM, 50mcg/0.5mL 2023, 10/11/2024    Hep A-Hep B, TWINRIX, (age 18y+), IM, 1mL 2022, 10/06/2022, 2023    Influenza Virus Vaccine 10/14/2014, 10/06/2016, 10/19/2017, 10/09/2018, 10/15/2019, 10/08/2020, 10/06/2021, 10/11/2024    Influenza Whole 10/06/2010    Influenza, FLUARIX, FLULAVAL, FLUZONE (age 6 mo+) and AFLURIA, (age 3 y+), Quadv PF, 0.5mL 10/04/2022, 10/05/2023    TDaP, ADACEL (age 10y-64y), BOOSTRIX (age 10y+), IM, 0.5mL 2008    Zoster Recombinant (Shingrix) 2024, 10/18/2024       Active Problems:  Patient Active Problem

## 2025-05-01 ENCOUNTER — OFFICE VISIT (OUTPATIENT)
Dept: FAMILY MEDICINE CLINIC | Age: 60
End: 2025-05-01
Payer: COMMERCIAL

## 2025-05-01 VITALS
OXYGEN SATURATION: 98 % | SYSTOLIC BLOOD PRESSURE: 134 MMHG | BODY MASS INDEX: 31.51 KG/M2 | DIASTOLIC BLOOD PRESSURE: 86 MMHG | WEIGHT: 156 LBS | HEART RATE: 76 BPM

## 2025-05-01 DIAGNOSIS — E78.49 OTHER HYPERLIPIDEMIA: ICD-10-CM

## 2025-05-01 DIAGNOSIS — E03.9 ACQUIRED HYPOTHYROIDISM: ICD-10-CM

## 2025-05-01 DIAGNOSIS — H10.13 ALLERGIC CONJUNCTIVITIS OF BOTH EYES: Primary | ICD-10-CM

## 2025-05-01 DIAGNOSIS — G43.109 MIGRAINE WITH AURA, NOT INTRACTABLE, WITHOUT STATUS MIGRAINOSUS: ICD-10-CM

## 2025-05-01 DIAGNOSIS — I10 PRIMARY HYPERTENSION: ICD-10-CM

## 2025-05-01 DIAGNOSIS — F41.9 ANXIETY: ICD-10-CM

## 2025-05-01 PROCEDURE — 99214 OFFICE O/P EST MOD 30 MIN: CPT | Performed by: FAMILY MEDICINE

## 2025-05-01 PROCEDURE — 3017F COLORECTAL CA SCREEN DOC REV: CPT | Performed by: FAMILY MEDICINE

## 2025-05-01 PROCEDURE — G8427 DOCREV CUR MEDS BY ELIG CLIN: HCPCS | Performed by: FAMILY MEDICINE

## 2025-05-01 PROCEDURE — 3075F SYST BP GE 130 - 139MM HG: CPT | Performed by: FAMILY MEDICINE

## 2025-05-01 PROCEDURE — 3079F DIAST BP 80-89 MM HG: CPT | Performed by: FAMILY MEDICINE

## 2025-05-01 PROCEDURE — 1036F TOBACCO NON-USER: CPT | Performed by: FAMILY MEDICINE

## 2025-05-01 PROCEDURE — G8417 CALC BMI ABV UP PARAM F/U: HCPCS | Performed by: FAMILY MEDICINE

## 2025-05-01 RX ORDER — LOSARTAN POTASSIUM 50 MG/1
50 TABLET ORAL DAILY
Qty: 90 TABLET | Refills: 1 | Status: SHIPPED | OUTPATIENT
Start: 2025-05-01

## 2025-05-01 RX ORDER — ESCITALOPRAM OXALATE 20 MG/1
TABLET ORAL
Qty: 90 TABLET | Refills: 1 | Status: SHIPPED | OUTPATIENT
Start: 2025-05-01

## 2025-05-01 RX ORDER — ATORVASTATIN CALCIUM 10 MG/1
10 TABLET, FILM COATED ORAL DAILY
Qty: 90 TABLET | Refills: 1 | Status: SHIPPED | OUTPATIENT
Start: 2025-05-01

## 2025-05-01 RX ORDER — LEVOTHYROXINE SODIUM 50 UG/1
50 TABLET ORAL DAILY
Qty: 90 TABLET | Refills: 1 | Status: SHIPPED | OUTPATIENT
Start: 2025-05-01

## 2025-05-01 RX ORDER — HYDROCHLOROTHIAZIDE 25 MG/1
25 TABLET ORAL DAILY
Qty: 90 TABLET | Refills: 1 | Status: SHIPPED | OUTPATIENT
Start: 2025-05-01

## 2025-05-01 RX ORDER — ONDANSETRON 4 MG/1
4 TABLET, ORALLY DISINTEGRATING ORAL 3 TIMES DAILY PRN
Qty: 21 TABLET | Refills: 0 | Status: SHIPPED | OUTPATIENT
Start: 2025-05-01

## 2025-05-01 SDOH — ECONOMIC STABILITY: FOOD INSECURITY: WITHIN THE PAST 12 MONTHS, YOU WORRIED THAT YOUR FOOD WOULD RUN OUT BEFORE YOU GOT MONEY TO BUY MORE.: NEVER TRUE

## 2025-05-01 SDOH — ECONOMIC STABILITY: FOOD INSECURITY: WITHIN THE PAST 12 MONTHS, THE FOOD YOU BOUGHT JUST DIDN'T LAST AND YOU DIDN'T HAVE MONEY TO GET MORE.: NEVER TRUE

## 2025-05-01 ASSESSMENT — ENCOUNTER SYMPTOMS
ABDOMINAL PAIN: 0
SHORTNESS OF BREATH: 0
ORTHOPNEA: 0
BLOOD IN STOOL: 0
EYE ITCHING: 0
DIARRHEA: 0
VOMITING: 0
COUGH: 0
NAUSEA: 0
BLURRED VISION: 1
DOUBLE VISION: 0
CONSTIPATION: 0
EYE REDNESS: 1
EYE DISCHARGE: 0

## 2025-05-01 ASSESSMENT — PATIENT HEALTH QUESTIONNAIRE - PHQ9
SUM OF ALL RESPONSES TO PHQ QUESTIONS 1-9: 2
1. LITTLE INTEREST OR PLEASURE IN DOING THINGS: SEVERAL DAYS
SUM OF ALL RESPONSES TO PHQ QUESTIONS 1-9: 2
SUM OF ALL RESPONSES TO PHQ QUESTIONS 1-9: 2
2. FEELING DOWN, DEPRESSED OR HOPELESS: SEVERAL DAYS
SUM OF ALL RESPONSES TO PHQ QUESTIONS 1-9: 2

## 2025-05-01 NOTE — PATIENT INSTRUCTIONS
SURVEY:    You may be receiving a survey from [employer] regarding your visit today.    Please complete the survey to enable us to provide the highest quality of care to you and your family.      Thank you.    Clinical Care Team: MD Bret Golden LPN              Triage: Edie Ford CMA              Clerical Team: Edie Mott

## 2025-05-01 NOTE — PROGRESS NOTES
Topic Date Due    HIV screen  Never done    Hepatitis C screen  Never done    Diabetes screen  Never done    Pneumococcal 50+ years Vaccine (1 of 1 - PCV) Never done       Past Surgical History:     Past Surgical History:   Procedure Laterality Date    BACK SURGERY      cervical spine injections    BLADDER SURGERY  1972    CARPAL TUNNEL RELEASE Right     right dequervains also    CERVICAL SPINE SURGERY  2004    HYSTERECTOMY, TOTAL ABDOMINAL (CERVIX REMOVED)  2001    OVARY REMOVAL  2008    SHOULDER SURGERY  2003        Medications:       Prior to Admission medications    Medication Sig Start Date End Date Taking? Authorizing Provider   atorvastatin (LIPITOR) 10 MG tablet Take 1 tablet by mouth daily 5/1/25  Yes Kassie Franks MD   escitalopram (LEXAPRO) 20 MG tablet TAKE 1 TABLET BY MOUTH ONCE DAILY 5/1/25  Yes Kassie Franks MD   hydroCHLOROthiazide (HYDRODIURIL) 25 MG tablet Take 1 tablet by mouth daily 5/1/25  Yes Kassie Franks MD   levothyroxine (SYNTHROID) 50 MCG tablet Take 1 tablet by mouth daily 5/1/25  Yes Kassie Franks MD   losartan (COZAAR) 50 MG tablet Take 1 tablet by mouth daily 5/1/25  Yes Kassie Franks MD   tiZANidine (ZANAFLEX) 4 MG tablet TAKE 2 TABLETS BY MOUTH EVERY EVENING 5/1/25  Yes Kassie Franks MD   ondansetron (ZOFRAN-ODT) 4 MG disintegrating tablet Take 1 tablet by mouth 3 times daily as needed for Nausea or Vomiting 5/1/25  Yes Kassie Franks MD   hydrOXYzine HCl (ATARAX) 25 MG tablet Take 1 tablet by mouth 3 times daily as needed for Anxiety 11/20/24  Yes Kassie Franks MD   butalbital-APAP-caffeine -40 MG CAPS per capsule Take 1 capsule by mouth every 4 hours as needed for Headaches or Migraine 4/11/24  Yes Kassie Franks MD        Allergies:       Norvasc [amlodipine] and Sulfa antibiotics    Social History:     Tobacco:    reports that she has never smoked. She has never been exposed to tobacco smoke. She has never used smokeless

## 2025-06-12 ENCOUNTER — HOSPITAL ENCOUNTER (OUTPATIENT)
Dept: GENERAL RADIOLOGY | Age: 60
Discharge: HOME | End: 2025-06-14
Payer: COMMERCIAL

## 2025-06-12 ENCOUNTER — TRANSCRIBE ORDERS (OUTPATIENT)
Dept: ADMISSION | Age: 60
End: 2025-06-12

## 2025-06-12 DIAGNOSIS — M54.41 ACUTE RIGHT-SIDED LOW BACK PAIN WITH RIGHT-SIDED SCIATICA: ICD-10-CM

## 2025-06-12 DIAGNOSIS — M54.41 ACUTE RIGHT-SIDED LOW BACK PAIN WITH RIGHT-SIDED SCIATICA: Primary | ICD-10-CM

## 2025-06-12 PROCEDURE — 72170 X-RAY EXAM OF PELVIS: CPT

## 2025-06-12 PROCEDURE — 72100 X-RAY EXAM L-S SPINE 2/3 VWS: CPT

## 2025-07-02 PROCEDURE — 78452 HT MUSCLE IMAGE SPECT MULT: CPT | Performed by: INTERNAL MEDICINE

## 2025-07-02 PROCEDURE — 93016 CV STRESS TEST SUPVJ ONLY: CPT | Performed by: INTERNAL MEDICINE

## 2025-07-02 PROCEDURE — 93018 CV STRESS TEST I&R ONLY: CPT | Performed by: INTERNAL MEDICINE

## 2025-08-13 ENCOUNTER — OFFICE VISIT (OUTPATIENT)
Dept: FAMILY MEDICINE CLINIC | Age: 60
End: 2025-08-13
Payer: COMMERCIAL

## 2025-08-13 VITALS
SYSTOLIC BLOOD PRESSURE: 116 MMHG | HEART RATE: 72 BPM | DIASTOLIC BLOOD PRESSURE: 70 MMHG | BODY MASS INDEX: 32.72 KG/M2 | WEIGHT: 162 LBS | OXYGEN SATURATION: 96 %

## 2025-08-13 DIAGNOSIS — R20.0 BILATERAL HAND NUMBNESS: Primary | ICD-10-CM

## 2025-08-13 DIAGNOSIS — R20.0 ARM NUMBNESS LEFT: ICD-10-CM

## 2025-08-13 PROCEDURE — 1036F TOBACCO NON-USER: CPT | Performed by: FAMILY MEDICINE

## 2025-08-13 PROCEDURE — G8427 DOCREV CUR MEDS BY ELIG CLIN: HCPCS | Performed by: FAMILY MEDICINE

## 2025-08-13 PROCEDURE — 99213 OFFICE O/P EST LOW 20 MIN: CPT | Performed by: FAMILY MEDICINE

## 2025-08-13 PROCEDURE — G8417 CALC BMI ABV UP PARAM F/U: HCPCS | Performed by: FAMILY MEDICINE

## 2025-08-13 PROCEDURE — 3017F COLORECTAL CA SCREEN DOC REV: CPT | Performed by: FAMILY MEDICINE

## 2025-08-13 RX ORDER — GABAPENTIN 100 MG/1
100 CAPSULE ORAL DAILY
Qty: 30 CAPSULE | Refills: 0 | Status: SHIPPED | OUTPATIENT
Start: 2025-08-13 | End: 2025-09-12

## 2025-08-27 ENCOUNTER — TELEPHONE (OUTPATIENT)
Dept: FAMILY MEDICINE CLINIC | Age: 60
End: 2025-08-27

## 2025-08-27 RX ORDER — MEFLOQUINE HYDROCHLORIDE 250 MG/1
TABLET ORAL
Qty: 7 TABLET | Refills: 0 | Status: SHIPPED | OUTPATIENT
Start: 2025-08-27

## 2025-08-28 ENCOUNTER — TELEPHONE (OUTPATIENT)
Dept: FAMILY MEDICINE CLINIC | Age: 60
End: 2025-08-28